# Patient Record
Sex: MALE | Race: WHITE | Employment: OTHER | URBAN - METROPOLITAN AREA
[De-identification: names, ages, dates, MRNs, and addresses within clinical notes are randomized per-mention and may not be internally consistent; named-entity substitution may affect disease eponyms.]

---

## 2018-06-05 ENCOUNTER — HOSPITAL ENCOUNTER (EMERGENCY)
Age: 73
Discharge: HOME | End: 2018-06-05
Attending: EMERGENCY MEDICINE
Payer: MEDICARE

## 2018-06-05 ENCOUNTER — APPOINTMENT (OUTPATIENT)
Dept: GENERAL RADIOLOGY | Age: 73
End: 2018-06-05
Payer: MEDICARE

## 2018-06-05 VITALS
HEART RATE: 78 BPM | WEIGHT: 213 LBS | TEMPERATURE: 98.7 F | DIASTOLIC BLOOD PRESSURE: 55 MMHG | RESPIRATION RATE: 16 BRPM | SYSTOLIC BLOOD PRESSURE: 123 MMHG | HEIGHT: 69 IN | BODY MASS INDEX: 31.55 KG/M2 | OXYGEN SATURATION: 95 %

## 2018-06-05 DIAGNOSIS — R05.9 COUGH: ICD-10-CM

## 2018-06-05 DIAGNOSIS — J40 BRONCHITIS: Primary | ICD-10-CM

## 2018-06-05 LAB
ANION GAP SERPL CALCULATED.3IONS-SCNC: 10 MMOL/L (ref 7–16)
BASOPHILS ABSOLUTE: 0.02 E9/L (ref 0–0.2)
BASOPHILS RELATIVE PERCENT: 0.6 % (ref 0–2)
BUN BLDV-MCNC: 10 MG/DL (ref 8–23)
CALCIUM SERPL-MCNC: 8.5 MG/DL (ref 8.6–10.2)
CHLORIDE BLD-SCNC: 100 MMOL/L (ref 98–107)
CO2: 24 MMOL/L (ref 22–29)
CREAT SERPL-MCNC: 0.8 MG/DL (ref 0.7–1.2)
EOSINOPHILS ABSOLUTE: 0.09 E9/L (ref 0.05–0.5)
EOSINOPHILS RELATIVE PERCENT: 2.9 % (ref 0–6)
GFR AFRICAN AMERICAN: >60
GFR NON-AFRICAN AMERICAN: >60 ML/MIN/1.73
GLUCOSE BLD-MCNC: 115 MG/DL (ref 74–109)
HCT VFR BLD CALC: 33.8 % (ref 37–54)
HEMOGLOBIN: 10.8 G/DL (ref 12.5–16.5)
IMMATURE GRANULOCYTES #: 0.02 E9/L
IMMATURE GRANULOCYTES %: 0.6 % (ref 0–5)
LYMPHOCYTES ABSOLUTE: 0.37 E9/L (ref 1.5–4)
LYMPHOCYTES RELATIVE PERCENT: 11.8 % (ref 20–42)
MCH RBC QN AUTO: 31.3 PG (ref 26–35)
MCHC RBC AUTO-ENTMCNC: 32 % (ref 32–34.5)
MCV RBC AUTO: 98 FL (ref 80–99.9)
MONOCYTES ABSOLUTE: 0.71 E9/L (ref 0.1–0.95)
MONOCYTES RELATIVE PERCENT: 22.6 % (ref 2–12)
NEUTROPHILS ABSOLUTE: 1.93 E9/L (ref 1.8–7.3)
NEUTROPHILS RELATIVE PERCENT: 61.5 % (ref 43–80)
PDW BLD-RTO: 15.6 FL (ref 11.5–15)
PLATELET # BLD: 61 E9/L (ref 130–450)
PLATELET CONFIRMATION: NORMAL
PMV BLD AUTO: 10.7 FL (ref 7–12)
POTASSIUM SERPL-SCNC: 4.5 MMOL/L (ref 3.5–5)
PRO-BNP: 356 PG/ML (ref 0–125)
RBC # BLD: 3.45 E12/L (ref 3.8–5.8)
SODIUM BLD-SCNC: 134 MMOL/L (ref 132–146)
TROPONIN: 0.02 NG/ML (ref 0–0.03)
WBC # BLD: 3.1 E9/L (ref 4.5–11.5)

## 2018-06-05 PROCEDURE — 6370000000 HC RX 637 (ALT 250 FOR IP): Performed by: STUDENT IN AN ORGANIZED HEALTH CARE EDUCATION/TRAINING PROGRAM

## 2018-06-05 PROCEDURE — 99284 EMERGENCY DEPT VISIT MOD MDM: CPT

## 2018-06-05 PROCEDURE — 94664 DEMO&/EVAL PT USE INHALER: CPT

## 2018-06-05 PROCEDURE — 80048 BASIC METABOLIC PNL TOTAL CA: CPT

## 2018-06-05 PROCEDURE — 94640 AIRWAY INHALATION TREATMENT: CPT

## 2018-06-05 PROCEDURE — 71046 X-RAY EXAM CHEST 2 VIEWS: CPT

## 2018-06-05 PROCEDURE — 83880 ASSAY OF NATRIURETIC PEPTIDE: CPT

## 2018-06-05 PROCEDURE — 85025 COMPLETE CBC W/AUTO DIFF WBC: CPT

## 2018-06-05 PROCEDURE — 84484 ASSAY OF TROPONIN QUANT: CPT

## 2018-06-05 RX ORDER — GUAIFENESIN 1200 MG/1
1 TABLET, EXTENDED RELEASE ORAL 2 TIMES DAILY PRN
Qty: 14 TABLET | Refills: 0 | Status: SHIPPED | OUTPATIENT
Start: 2018-06-05 | End: 2018-06-12

## 2018-06-05 RX ORDER — BENZONATATE 100 MG/1
100 CAPSULE ORAL 3 TIMES DAILY PRN
Qty: 21 CAPSULE | Refills: 0 | Status: SHIPPED | OUTPATIENT
Start: 2018-06-05 | End: 2018-06-12

## 2018-06-05 RX ORDER — IPRATROPIUM BROMIDE AND ALBUTEROL SULFATE 2.5; .5 MG/3ML; MG/3ML
3 SOLUTION RESPIRATORY (INHALATION) ONCE
Status: COMPLETED | OUTPATIENT
Start: 2018-06-05 | End: 2018-06-05

## 2018-06-05 RX ORDER — ALBUTEROL SULFATE 90 UG/1
2 AEROSOL, METERED RESPIRATORY (INHALATION) EVERY 6 HOURS PRN
Qty: 1 INHALER | Refills: 0 | Status: ON HOLD | OUTPATIENT
Start: 2018-06-05 | End: 2018-09-13 | Stop reason: HOSPADM

## 2018-06-05 RX ORDER — METOPROLOL SUCCINATE 50 MG/1
50 TABLET, EXTENDED RELEASE ORAL DAILY
COMMUNITY

## 2018-06-05 RX ADMIN — IPRATROPIUM BROMIDE AND ALBUTEROL SULFATE 3 AMPULE: .5; 3 SOLUTION RESPIRATORY (INHALATION) at 13:00

## 2018-06-05 ASSESSMENT — ENCOUNTER SYMPTOMS
SORE THROAT: 0
CONSTIPATION: 0
VOMITING: 0
COUGH: 1
NAUSEA: 0
DIARRHEA: 0
CHEST TIGHTNESS: 0
WHEEZING: 1
SHORTNESS OF BREATH: 1
ABDOMINAL PAIN: 0
BACK PAIN: 0
RHINORRHEA: 1

## 2018-09-07 ENCOUNTER — HOSPITAL ENCOUNTER (OUTPATIENT)
Dept: ULTRASOUND IMAGING | Age: 73
Discharge: HOME | End: 2018-09-09
Payer: MEDICARE

## 2018-09-07 DIAGNOSIS — D69.6 THROMBOCYTOPENIA, UNSPECIFIED (HCC): ICD-10-CM

## 2018-09-07 PROCEDURE — 76700 US EXAM ABDOM COMPLETE: CPT

## 2018-09-08 ENCOUNTER — APPOINTMENT (OUTPATIENT)
Dept: CT IMAGING | Age: 73
DRG: 571 | End: 2018-09-08
Payer: MEDICARE

## 2018-09-08 ENCOUNTER — APPOINTMENT (OUTPATIENT)
Dept: GENERAL RADIOLOGY | Age: 73
DRG: 571 | End: 2018-09-08
Payer: MEDICARE

## 2018-09-08 ENCOUNTER — HOSPITAL ENCOUNTER (INPATIENT)
Age: 73
LOS: 5 days | Discharge: NURSING, REHAB OR LONG-TERM CARE | DRG: 571 | End: 2018-09-13
Attending: EMERGENCY MEDICINE | Admitting: INTERNAL MEDICINE
Payer: MEDICARE

## 2018-09-08 ENCOUNTER — APPOINTMENT (OUTPATIENT)
Dept: ULTRASOUND IMAGING | Age: 73
DRG: 571 | End: 2018-09-08
Payer: MEDICARE

## 2018-09-08 DIAGNOSIS — R19.7 DIARRHEA, UNSPECIFIED TYPE: ICD-10-CM

## 2018-09-08 DIAGNOSIS — L03.115 CELLULITIS OF RIGHT LOWER EXTREMITY: Primary | ICD-10-CM

## 2018-09-08 PROBLEM — L03.90 CELLULITIS: Status: ACTIVE | Noted: 2018-09-08

## 2018-09-08 PROBLEM — I10 HYPERTENSION: Chronic | Status: ACTIVE | Noted: 2018-09-08

## 2018-09-08 LAB
ALBUMIN SERPL-MCNC: 3 G/DL (ref 3.5–5.2)
ALP BLD-CCNC: 63 U/L (ref 40–129)
ALT SERPL-CCNC: 18 U/L (ref 0–40)
ANION GAP SERPL CALCULATED.3IONS-SCNC: 12 MMOL/L (ref 7–16)
ANISOCYTOSIS: ABNORMAL
ANTISTREPTOLYSIN-O: 68 IU/ML (ref 0–200)
AST SERPL-CCNC: 39 U/L (ref 0–39)
BASOPHILS ABSOLUTE: 0.01 E9/L (ref 0–0.2)
BASOPHILS RELATIVE PERCENT: 0.1 % (ref 0–2)
BILIRUB SERPL-MCNC: 2.2 MG/DL (ref 0–1.2)
BUN BLDV-MCNC: 19 MG/DL (ref 8–23)
C-REACTIVE PROTEIN: 8.5 MG/DL (ref 0–0.4)
CALCIUM SERPL-MCNC: 8.5 MG/DL (ref 8.6–10.2)
CHLORIDE BLD-SCNC: 98 MMOL/L (ref 98–107)
CO2: 22 MMOL/L (ref 22–29)
CREAT SERPL-MCNC: 0.9 MG/DL (ref 0.7–1.2)
DOHLE BODIES: ABNORMAL
EKG ATRIAL RATE: 90 BPM
EKG P AXIS: 8 DEGREES
EKG P-R INTERVAL: 156 MS
EKG Q-T INTERVAL: 354 MS
EKG QRS DURATION: 96 MS
EKG QTC CALCULATION (BAZETT): 433 MS
EKG R AXIS: 3 DEGREES
EKG T AXIS: 22 DEGREES
EKG VENTRICULAR RATE: 90 BPM
EOSINOPHILS ABSOLUTE: 0.01 E9/L (ref 0.05–0.5)
EOSINOPHILS RELATIVE PERCENT: 0.1 % (ref 0–6)
GFR AFRICAN AMERICAN: >60
GFR NON-AFRICAN AMERICAN: >60 ML/MIN/1.73
GLUCOSE BLD-MCNC: 180 MG/DL (ref 74–109)
HBA1C MFR BLD: 5.9 % (ref 4–5.6)
HCT VFR BLD CALC: 35.1 % (ref 37–54)
HEMOGLOBIN: 11.2 G/DL (ref 12.5–16.5)
IMMATURE GRANULOCYTES #: 0.81 E9/L
IMMATURE GRANULOCYTES %: 7.1 % (ref 0–5)
LACTIC ACID: 2 MMOL/L (ref 0.5–2.2)
LACTIC ACID: 2.8 MMOL/L (ref 0.5–2.2)
LYMPHOCYTES ABSOLUTE: 0.38 E9/L (ref 1.5–4)
LYMPHOCYTES RELATIVE PERCENT: 3.3 % (ref 20–42)
MCH RBC QN AUTO: 31.6 PG (ref 26–35)
MCHC RBC AUTO-ENTMCNC: 31.9 % (ref 32–34.5)
MCV RBC AUTO: 99.2 FL (ref 80–99.9)
MONOCYTES ABSOLUTE: 1.49 E9/L (ref 0.1–0.95)
MONOCYTES RELATIVE PERCENT: 13 % (ref 2–12)
NEUTROPHILS ABSOLUTE: 8.73 E9/L (ref 1.8–7.3)
NEUTROPHILS RELATIVE PERCENT: 76.4 % (ref 43–80)
OVALOCYTES: ABNORMAL
PDW BLD-RTO: 17.2 FL (ref 11.5–15)
PLATELET # BLD: 46 E9/L (ref 130–450)
PLATELET CONFIRMATION: NORMAL
PMV BLD AUTO: 10.8 FL (ref 7–12)
POIKILOCYTES: ABNORMAL
POLYCHROMASIA: ABNORMAL
POTASSIUM SERPL-SCNC: 3.7 MMOL/L (ref 3.5–5)
PRO-BNP: 1393 PG/ML (ref 0–125)
RBC # BLD: 3.54 E12/L (ref 3.8–5.8)
SEDIMENTATION RATE, ERYTHROCYTE: 0 MM/HR (ref 0–15)
SODIUM BLD-SCNC: 132 MMOL/L (ref 132–146)
TOTAL PROTEIN: 7.2 G/DL (ref 6.4–8.3)
TROPONIN: 0.03 NG/ML (ref 0–0.03)
WBC # BLD: 11.4 E9/L (ref 4.5–11.5)

## 2018-09-08 PROCEDURE — 6370000000 HC RX 637 (ALT 250 FOR IP): Performed by: INTERNAL MEDICINE

## 2018-09-08 PROCEDURE — 83880 ASSAY OF NATRIURETIC PEPTIDE: CPT

## 2018-09-08 PROCEDURE — 93005 ELECTROCARDIOGRAM TRACING: CPT | Performed by: NURSE PRACTITIONER

## 2018-09-08 PROCEDURE — 1200000000 HC SEMI PRIVATE

## 2018-09-08 PROCEDURE — 96374 THER/PROPH/DIAG INJ IV PUSH: CPT

## 2018-09-08 PROCEDURE — 83036 HEMOGLOBIN GLYCOSYLATED A1C: CPT

## 2018-09-08 PROCEDURE — 87070 CULTURE OTHR SPECIMN AEROBIC: CPT

## 2018-09-08 PROCEDURE — 6360000002 HC RX W HCPCS: Performed by: SPECIALIST

## 2018-09-08 PROCEDURE — 6370000000 HC RX 637 (ALT 250 FOR IP): Performed by: NURSE PRACTITIONER

## 2018-09-08 PROCEDURE — 2580000003 HC RX 258: Performed by: INTERNAL MEDICINE

## 2018-09-08 PROCEDURE — 93971 EXTREMITY STUDY: CPT

## 2018-09-08 PROCEDURE — 71275 CT ANGIOGRAPHY CHEST: CPT

## 2018-09-08 PROCEDURE — 87149 DNA/RNA DIRECT PROBE: CPT

## 2018-09-08 PROCEDURE — 87186 SC STD MICRODIL/AGAR DIL: CPT

## 2018-09-08 PROCEDURE — 86060 ANTISTREPTOLYSIN O TITER: CPT

## 2018-09-08 PROCEDURE — 99285 EMERGENCY DEPT VISIT HI MDM: CPT

## 2018-09-08 PROCEDURE — 85025 COMPLETE CBC W/AUTO DIFF WBC: CPT

## 2018-09-08 PROCEDURE — 73630 X-RAY EXAM OF FOOT: CPT

## 2018-09-08 PROCEDURE — 6360000004 HC RX CONTRAST MEDICATION: Performed by: RADIOLOGY

## 2018-09-08 PROCEDURE — 87040 BLOOD CULTURE FOR BACTERIA: CPT

## 2018-09-08 PROCEDURE — 84484 ASSAY OF TROPONIN QUANT: CPT

## 2018-09-08 PROCEDURE — 71045 X-RAY EXAM CHEST 1 VIEW: CPT

## 2018-09-08 PROCEDURE — 86140 C-REACTIVE PROTEIN: CPT

## 2018-09-08 PROCEDURE — 85651 RBC SED RATE NONAUTOMATED: CPT

## 2018-09-08 PROCEDURE — 83605 ASSAY OF LACTIC ACID: CPT

## 2018-09-08 PROCEDURE — 80053 COMPREHEN METABOLIC PANEL: CPT

## 2018-09-08 PROCEDURE — 36415 COLL VENOUS BLD VENIPUNCTURE: CPT

## 2018-09-08 PROCEDURE — 2580000003 HC RX 258: Performed by: SPECIALIST

## 2018-09-08 PROCEDURE — 6360000002 HC RX W HCPCS: Performed by: NURSE PRACTITIONER

## 2018-09-08 PROCEDURE — 2580000003 HC RX 258: Performed by: NURSE PRACTITIONER

## 2018-09-08 RX ORDER — UBIDECARENONE 75 MG
100 CAPSULE ORAL DAILY
COMMUNITY

## 2018-09-08 RX ORDER — MULTIVITAMIN WITH IRON
100 TABLET ORAL DAILY
COMMUNITY

## 2018-09-08 RX ORDER — METOPROLOL SUCCINATE 50 MG/1
50 TABLET, EXTENDED RELEASE ORAL DAILY
Status: DISCONTINUED | OUTPATIENT
Start: 2018-09-08 | End: 2018-09-13 | Stop reason: HOSPADM

## 2018-09-08 RX ORDER — SODIUM CHLORIDE 0.9 % (FLUSH) 0.9 %
10 SYRINGE (ML) INJECTION PRN
Status: DISCONTINUED | OUTPATIENT
Start: 2018-09-08 | End: 2018-09-13 | Stop reason: HOSPADM

## 2018-09-08 RX ORDER — MORPHINE SULFATE 4 MG/ML
4 INJECTION, SOLUTION INTRAMUSCULAR; INTRAVENOUS ONCE
Status: COMPLETED | OUTPATIENT
Start: 2018-09-08 | End: 2018-09-08

## 2018-09-08 RX ORDER — 0.9 % SODIUM CHLORIDE 0.9 %
500 INTRAVENOUS SOLUTION INTRAVENOUS ONCE
Status: COMPLETED | OUTPATIENT
Start: 2018-09-08 | End: 2018-09-08

## 2018-09-08 RX ORDER — ALBUTEROL SULFATE 90 UG/1
2 AEROSOL, METERED RESPIRATORY (INHALATION) EVERY 6 HOURS PRN
Status: DISCONTINUED | OUTPATIENT
Start: 2018-09-08 | End: 2018-09-13 | Stop reason: HOSPADM

## 2018-09-08 RX ORDER — SODIUM CHLORIDE 0.9 % (FLUSH) 0.9 %
10 SYRINGE (ML) INJECTION EVERY 12 HOURS SCHEDULED
Status: DISCONTINUED | OUTPATIENT
Start: 2018-09-08 | End: 2018-09-13 | Stop reason: HOSPADM

## 2018-09-08 RX ORDER — TURMERIC 400 MG
400 CAPSULE ORAL
COMMUNITY

## 2018-09-08 RX ORDER — ACETAMINOPHEN 325 MG/1
650 TABLET ORAL ONCE
Status: COMPLETED | OUTPATIENT
Start: 2018-09-08 | End: 2018-09-08

## 2018-09-08 RX ORDER — ONDANSETRON 2 MG/ML
4 INJECTION INTRAMUSCULAR; INTRAVENOUS EVERY 6 HOURS PRN
Status: DISCONTINUED | OUTPATIENT
Start: 2018-09-08 | End: 2018-09-13 | Stop reason: HOSPADM

## 2018-09-08 RX ADMIN — MORPHINE SULFATE 4 MG: 4 INJECTION, SOLUTION INTRAMUSCULAR; INTRAVENOUS at 11:05

## 2018-09-08 RX ADMIN — IOPAMIDOL 80 ML: 755 INJECTION, SOLUTION INTRAVENOUS at 12:19

## 2018-09-08 RX ADMIN — ALBUTEROL SULFATE 2 PUFF: 90 AEROSOL, METERED RESPIRATORY (INHALATION) at 18:45

## 2018-09-08 RX ADMIN — Medication 10 ML: at 15:52

## 2018-09-08 RX ADMIN — ACETAMINOPHEN 650 MG: 325 TABLET ORAL at 11:03

## 2018-09-08 RX ADMIN — Medication 10 ML: at 18:45

## 2018-09-08 RX ADMIN — SODIUM CHLORIDE 500 ML: 9 INJECTION, SOLUTION INTRAVENOUS at 12:34

## 2018-09-08 RX ADMIN — CEFEPIME HYDROCHLORIDE 2 G: 2 INJECTION, POWDER, FOR SOLUTION INTRAVENOUS at 20:00

## 2018-09-08 ASSESSMENT — PAIN SCALES - GENERAL
PAINLEVEL_OUTOF10: 10
PAINLEVEL_OUTOF10: 10
PAINLEVEL_OUTOF10: 0
PAINLEVEL_OUTOF10: 10
PAINLEVEL_OUTOF10: 0

## 2018-09-08 ASSESSMENT — PAIN DESCRIPTION - LOCATION: LOCATION: LEG

## 2018-09-08 ASSESSMENT — PAIN DESCRIPTION - DESCRIPTORS: DESCRIPTORS: ACHING

## 2018-09-08 ASSESSMENT — PAIN DESCRIPTION - PAIN TYPE: TYPE: ACUTE PAIN

## 2018-09-08 ASSESSMENT — PAIN DESCRIPTION - ORIENTATION: ORIENTATION: RIGHT

## 2018-09-08 NOTE — ED NOTES
Right foot ball of foot with 0.5 cm laceration made by Dr Alpa Bowen using sterile technique and culture obtained. No drainage noted.       Vanessa Alas RN  09/08/18 [id number]

## 2018-09-08 NOTE — CONSULTS
Cans of beer per week    Drug use: No    Sexual activity: Not Asked     Other Topics Concern    None     Social History Narrative    None      Pets: None  Travel: No  Patient retired from working heavy machinery    Family History:   History reviewed. No pertinent family history. . Otherwise non-pertinent to the chief complaint. REVIEW OF SYSTEMS:    Constitutional: Positive for subjective fevers, chills, diaphoresis  Neurologic: Negative   Psychiatric: Negative  Rheumatologic: Negative   Endocrine: Negative  Hematologic: Negative  Immunologic: Negative  ENT: Negative  Respiratory: Negative   Cardiovascular: Negative  GI: Negative  : Negative  Musculoskeletal: Negative  Skin: No rashes. PHYSICAL EXAM:    Vitals:   BP (!) 110/56   Pulse 89   Temp 98.6 °F (37 °C) (Oral)   Resp 18   Ht 5' 9\" (1.753 m)   Wt 213 lb (96.6 kg)   SpO2 95%   BMI 31.45 kg/m²   Constitutional: The patient is awake, alert, and oriented. Skin: Warm and dry. No rashes were noted. HEENT: Eyes show round, and reactive pupils. No jaundice. Moist mucous membranes, no ulcerations, no thrush. Neck: Supple to movements. No lymphadenopathy. Chest: No use of accessory muscles to breathe. Symmetrical expansion. Auscultation reveals no wheezing, crackles, or rhonchi. Cardiovascular: S1 and S2 are rhythmic and regular. No murmurs appreciated. Abdomen: Positive bowel sounds to auscultation. Benign to palpation. No masses felt. No hepatosplenomegaly. Extremities: The right leg is diffusely erythematous. The right foot is quite edematous and twice the size of the left foot. There is a couple of blisters on the bottom of the foot and one starting on the dorsum of the 2nd toe. No crepitus.   Lines: peripheral      CBC+dif:  Recent Labs      09/08/18   1045   WBC  11.4   HGB  11.2*   HCT  35.1*   MCV  99.2   PLT  46*   NEUTROABS  8.73*     No results found for: CRP   No results found for: CRP  Lab Results   Component Value Date

## 2018-09-08 NOTE — ED PROVIDER NOTES
ED Attending  CC: No    HPI:  9/8/18, Time: 10:28 AM         Harvinder Solano is a 68 y.o. male presenting to the ED for right lower leg swelling, redness and pain going on over the last few weeks worsening over the last week or 2 per patient. Patient denies any history of DVT or blood clots. Patient is not on any anticoagulation medication. Patient denies any direct injury or trauma to the right lower extremity. Patient states he does have a history of neuropathy. Patient noticed he had some blistering to the right plantar aspect of his foot several weeks ago. He states that the redness and swelling has crept up from his foot onto his calf. Patient states that he has had tactile fevers at home. He has also had sweats. Patient states that he has an increase in shortness of breath over the last week or 2 as well. Patient states he is not normally short of breath like this. Patient denies any chest pain. Patient denies any abdominal pain, nausea, vomiting, or palpitations. Patient states that he is still ambulatory however it is getting more difficult to walk secondary to the swelling and pain. ROS:   Pertinent positives and negatives are stated within HPI, all other systems reviewed and are negative.  --------------------------------------------- PAST HISTORY ---------------------------------------------  Past Medical History:  has a past medical history of Hypertension and Neuropathy. Past Surgical History:  has a past surgical history that includes colectomy (2005); colostomy (2005); and Revision Colostomy (2005). Social History:  reports that he has quit smoking. He has never used smokeless tobacco. He reports that he drinks about 21.6 oz of alcohol per week . He reports that he does not use drugs. Family History: family history is not on file. The patients home medications have been reviewed.     Allergies: Patient has no known Range    WBC 11.4 4.5 - 11.5 E9/L    RBC 3.54 (L) 3.80 - 5.80 E12/L    Hemoglobin 11.2 (L) 12.5 - 16.5 g/dL    Hematocrit 35.1 (L) 37.0 - 54.0 %    MCV 99.2 80.0 - 99.9 fL    MCH 31.6 26.0 - 35.0 pg    MCHC 31.9 (L) 32.0 - 34.5 %    RDW 17.2 (H) 11.5 - 15.0 fL    Platelets 46 (L) 955 - 450 E9/L    MPV 10.8 7.0 - 12.0 fL    Neutrophils % 76.4 43.0 - 80.0 %    Immature Granulocytes % 7.1 (H) 0.0 - 5.0 %    Lymphocytes % 3.3 (L) 20.0 - 42.0 %    Monocytes % 13.0 (H) 2.0 - 12.0 %    Eosinophils % 0.1 0.0 - 6.0 %    Basophils % 0.1 0.0 - 2.0 %    Neutrophils # 8.73 (H) 1.80 - 7.30 E9/L    Immature Granulocytes # 0.81 E9/L    Lymphocytes # 0.38 (L) 1.50 - 4.00 E9/L    Monocytes # 1.49 (H) 0.10 - 0.95 E9/L    Eosinophils # 0.01 (L) 0.05 - 0.50 E9/L    Basophils # 0.01 0.00 - 0.20 E9/L    Dohle Bodies 1+     Anisocytosis 1+     Polychromasia 1+     Poikilocytes 1+     Ovalocytes 1+    Comprehensive Metabolic Panel   Result Value Ref Range    Sodium 132 132 - 146 mmol/L    Potassium 3.7 3.5 - 5.0 mmol/L    Chloride 98 98 - 107 mmol/L    CO2 22 22 - 29 mmol/L    Anion Gap 12 7 - 16 mmol/L    Glucose 180 (H) 74 - 109 mg/dL    BUN 19 8 - 23 mg/dL    CREATININE 0.9 0.7 - 1.2 mg/dL    GFR Non-African American >60 >=60 mL/min/1.73    GFR African American >60     Calcium 8.5 (L) 8.6 - 10.2 mg/dL    Total Protein 7.2 6.4 - 8.3 g/dL    Alb 3.0 (L) 3.5 - 5.2 g/dL    Total Bilirubin 2.2 (H) 0.0 - 1.2 mg/dL    Alkaline Phosphatase 63 40 - 129 U/L    ALT 18 0 - 40 U/L    AST 39 0 - 39 U/L   Lactic acid, plasma   Result Value Ref Range    Lactic Acid 2.8 (H) 0.5 - 2.2 mmol/L   Troponin   Result Value Ref Range    Troponin 0.03 0.00 - 0.03 ng/mL   Brain Natriuretic Peptide   Result Value Ref Range    Pro-BNP 1,393 (H) 0 - 125 pg/mL   Platelet Confirmation   Result Value Ref Range    Platelet Confirmation CONFIRMED    Lactic acid, plasma   Result Value Ref Range    Lactic Acid 2.0 0.5 - 2.2 mmol/L   Sedimentation Rate   Result Value Ref

## 2018-09-08 NOTE — PROGRESS NOTES
file.     Social History Main Topics    Smoking status: Former Smoker    Smokeless tobacco: Never Used      Comment: quit in 1990     Alcohol use 21.6 oz/week     36 Cans of beer per week    Drug use: No    Sexual activity: Not on file     Other Topics Concern    Not on file     Social History Narrative    No narrative on file     Family History:   History reviewed. No pertinent family history. PHYSICAL EXAM:      Vitals:    BP (!) 110/56   Pulse 76   Temp 98.1 °F (36.7 °C) (Oral)   Resp 16   Ht 5' 9\" (1.753 m)   Wt 213 lb (96.6 kg)   SpO2 97%   BMI 31.45 kg/m²     DERM: Right foot and lower extremity is erythematous, edematous. Increase in temperature noted. Dorsal right foot 1st interspace small fluctuant area. Dark in appearance. There is no crepitus however. Plantar right foot blistered areas appreciated as well some discoloration noted proximal aspect. Again no crepitus. NEUROLOGIC: Protective sensation diminished. VASCULAR: Dorsalis pedis and posterior tibial pulses audible utilizing hand-held Doppler bilateral.  MUSCULOSKELETAL: Muscle strength 5/5. Wound Care Documentation:  Wound 09/08/18 Other (Comment) Foot Right (Active)   Dressing Status Clean;Dry; Intact 9/8/2018  3:00 PM   Dressing Changed Changed/New 9/8/2018  3:00 PM   Dressing/Treatment Dry dressing 9/8/2018  3:00 PM   Wound Cleansed Rinsed/Irrigated with saline 9/8/2018  3:00 PM   Wound Length (cm) 0.5 cm 9/8/2018  3:00 PM   Wound Width (cm) 0.1 cm 9/8/2018  3:00 PM   Calculated Wound Size (cm^2) (l*w) 0.05 cm^2 9/8/2018  3:00 PM   Wound Assessment Edema; Red 9/8/2018  3:00 PM   Sabiha-wound Assessment Edema;Dry 9/8/2018  3:00 PM   Number of days: 0       Labs:  Cultures : No results found for: ORG  No results found for: WNDABS    X-rays of the right foot did not demonstrate any soft tissue air. There is no apparent bony destruction. Vascular calcifications were evident. IMPRESSION/RECOMMENDATIONS:    1. NPO after midnight.   OR in am for incision and drainage. Medicine for clearance. Discussed in detail possible complications including but not limited to persistence, delayed healing, loss of limb/life. No guarantees were made regarding a successful outcome, patient is fully aware end result may be a proximal type amputation. Still wished to proceed.       Electronically signed by Lily Rocha DPM on 9/8/2018 at 6:03 PM

## 2018-09-09 ENCOUNTER — ANESTHESIA (OUTPATIENT)
Dept: OPERATING ROOM | Age: 73
DRG: 571 | End: 2018-09-09
Payer: MEDICARE

## 2018-09-09 ENCOUNTER — ANESTHESIA EVENT (OUTPATIENT)
Dept: OPERATING ROOM | Age: 73
DRG: 571 | End: 2018-09-09
Payer: MEDICARE

## 2018-09-09 VITALS — OXYGEN SATURATION: 100 % | DIASTOLIC BLOOD PRESSURE: 55 MMHG | SYSTOLIC BLOOD PRESSURE: 107 MMHG

## 2018-09-09 PROBLEM — R78.81 BACTEREMIA: Status: ACTIVE | Noted: 2018-09-09

## 2018-09-09 LAB
ANION GAP SERPL CALCULATED.3IONS-SCNC: 13 MMOL/L (ref 7–16)
BUN BLDV-MCNC: 23 MG/DL (ref 8–23)
CALCIUM SERPL-MCNC: 8.2 MG/DL (ref 8.6–10.2)
CHLORIDE BLD-SCNC: 102 MMOL/L (ref 98–107)
CO2: 20 MMOL/L (ref 22–29)
CREAT SERPL-MCNC: 0.8 MG/DL (ref 0.7–1.2)
GFR AFRICAN AMERICAN: >60
GFR NON-AFRICAN AMERICAN: >60 ML/MIN/1.73
GLUCOSE BLD-MCNC: 136 MG/DL (ref 74–109)
HCT VFR BLD CALC: 33.5 % (ref 37–54)
HEMOGLOBIN: 11 G/DL (ref 12.5–16.5)
INR BLD: 1.9
MCH RBC QN AUTO: 31.8 PG (ref 26–35)
MCHC RBC AUTO-ENTMCNC: 32.8 % (ref 32–34.5)
MCV RBC AUTO: 96.8 FL (ref 80–99.9)
PDW BLD-RTO: 17.2 FL (ref 11.5–15)
PLATELET # BLD: 52 E9/L (ref 130–450)
PLATELET CONFIRMATION: NORMAL
PMV BLD AUTO: 10.5 FL (ref 7–12)
POTASSIUM SERPL-SCNC: 3.6 MMOL/L (ref 3.5–5)
PROTHROMBIN TIME: 20.7 SEC (ref 9.3–12.4)
RBC # BLD: 3.46 E12/L (ref 3.8–5.8)
SODIUM BLD-SCNC: 135 MMOL/L (ref 132–146)
WBC # BLD: 10.7 E9/L (ref 4.5–11.5)

## 2018-09-09 PROCEDURE — 7100000011 HC PHASE II RECOVERY - ADDTL 15 MIN: Performed by: PODIATRIST

## 2018-09-09 PROCEDURE — 6360000002 HC RX W HCPCS: Performed by: NURSE PRACTITIONER

## 2018-09-09 PROCEDURE — 2709999900 HC NON-CHARGEABLE SUPPLY: Performed by: PODIATRIST

## 2018-09-09 PROCEDURE — 3700000000 HC ANESTHESIA ATTENDED CARE: Performed by: PODIATRIST

## 2018-09-09 PROCEDURE — 87040 BLOOD CULTURE FOR BACTERIA: CPT

## 2018-09-09 PROCEDURE — 2580000003 HC RX 258: Performed by: INTERNAL MEDICINE

## 2018-09-09 PROCEDURE — 6360000002 HC RX W HCPCS: Performed by: ANESTHESIOLOGY

## 2018-09-09 PROCEDURE — 87102 FUNGUS ISOLATION CULTURE: CPT

## 2018-09-09 PROCEDURE — 2500000003 HC RX 250 WO HCPCS: Performed by: NURSE ANESTHETIST, CERTIFIED REGISTERED

## 2018-09-09 PROCEDURE — 1200000000 HC SEMI PRIVATE

## 2018-09-09 PROCEDURE — 3700000001 HC ADD 15 MINUTES (ANESTHESIA): Performed by: PODIATRIST

## 2018-09-09 PROCEDURE — 3600000002 HC SURGERY LEVEL 2 BASE: Performed by: PODIATRIST

## 2018-09-09 PROCEDURE — 6370000000 HC RX 637 (ALT 250 FOR IP): Performed by: INTERNAL MEDICINE

## 2018-09-09 PROCEDURE — 87075 CULTR BACTERIA EXCEPT BLOOD: CPT

## 2018-09-09 PROCEDURE — 87186 SC STD MICRODIL/AGAR DIL: CPT

## 2018-09-09 PROCEDURE — 6370000000 HC RX 637 (ALT 250 FOR IP): Performed by: PODIATRIST

## 2018-09-09 PROCEDURE — 3600000012 HC SURGERY LEVEL 2 ADDTL 15MIN: Performed by: PODIATRIST

## 2018-09-09 PROCEDURE — 87206 SMEAR FLUORESCENT/ACID STAI: CPT

## 2018-09-09 PROCEDURE — 85027 COMPLETE CBC AUTOMATED: CPT

## 2018-09-09 PROCEDURE — 6360000002 HC RX W HCPCS: Performed by: SPECIALIST

## 2018-09-09 PROCEDURE — 87176 TISSUE HOMOGENIZATION CULTR: CPT

## 2018-09-09 PROCEDURE — 0JBQ0ZZ EXCISION OF RIGHT FOOT SUBCUTANEOUS TISSUE AND FASCIA, OPEN APPROACH: ICD-10-PCS | Performed by: PODIATRIST

## 2018-09-09 PROCEDURE — 87070 CULTURE OTHR SPECIMN AEROBIC: CPT

## 2018-09-09 PROCEDURE — 80048 BASIC METABOLIC PNL TOTAL CA: CPT

## 2018-09-09 PROCEDURE — 2580000003 HC RX 258: Performed by: NURSE PRACTITIONER

## 2018-09-09 PROCEDURE — 2580000003 HC RX 258: Performed by: SPECIALIST

## 2018-09-09 PROCEDURE — 6360000002 HC RX W HCPCS: Performed by: NURSE ANESTHETIST, CERTIFIED REGISTERED

## 2018-09-09 PROCEDURE — 2500000003 HC RX 250 WO HCPCS: Performed by: PODIATRIST

## 2018-09-09 PROCEDURE — 88304 TISSUE EXAM BY PATHOLOGIST: CPT

## 2018-09-09 PROCEDURE — 36415 COLL VENOUS BLD VENIPUNCTURE: CPT

## 2018-09-09 PROCEDURE — 85610 PROTHROMBIN TIME: CPT

## 2018-09-09 PROCEDURE — 87205 SMEAR GRAM STAIN: CPT

## 2018-09-09 PROCEDURE — 87015 SPECIMEN INFECT AGNT CONCNTJ: CPT

## 2018-09-09 PROCEDURE — 7100000010 HC PHASE II RECOVERY - FIRST 15 MIN: Performed by: PODIATRIST

## 2018-09-09 PROCEDURE — 87116 MYCOBACTERIA CULTURE: CPT

## 2018-09-09 RX ORDER — BUPIVACAINE HYDROCHLORIDE 5 MG/ML
INJECTION, SOLUTION EPIDURAL; INTRACAUDAL PRN
Status: DISCONTINUED | OUTPATIENT
Start: 2018-09-09 | End: 2018-09-09 | Stop reason: HOSPADM

## 2018-09-09 RX ORDER — HYDROCODONE BITARTRATE AND ACETAMINOPHEN 5; 325 MG/1; MG/1
1 TABLET ORAL EVERY 4 HOURS PRN
Status: DISCONTINUED | OUTPATIENT
Start: 2018-09-09 | End: 2018-09-13 | Stop reason: HOSPADM

## 2018-09-09 RX ORDER — FENTANYL CITRATE 50 UG/ML
INJECTION, SOLUTION INTRAMUSCULAR; INTRAVENOUS PRN
Status: DISCONTINUED | OUTPATIENT
Start: 2018-09-09 | End: 2018-09-09 | Stop reason: SDUPTHER

## 2018-09-09 RX ORDER — FENTANYL CITRATE 50 UG/ML
25 INJECTION, SOLUTION INTRAMUSCULAR; INTRAVENOUS EVERY 5 MIN PRN
Status: DISCONTINUED | OUTPATIENT
Start: 2018-09-09 | End: 2018-09-09 | Stop reason: HOSPADM

## 2018-09-09 RX ORDER — FENTANYL CITRATE 50 UG/ML
50 INJECTION, SOLUTION INTRAMUSCULAR; INTRAVENOUS EVERY 5 MIN PRN
Status: DISCONTINUED | OUTPATIENT
Start: 2018-09-09 | End: 2018-09-09 | Stop reason: HOSPADM

## 2018-09-09 RX ORDER — MEPERIDINE HYDROCHLORIDE 25 MG/ML
12.5 INJECTION INTRAMUSCULAR; INTRAVENOUS; SUBCUTANEOUS EVERY 5 MIN PRN
Status: DISCONTINUED | OUTPATIENT
Start: 2018-09-09 | End: 2018-09-09 | Stop reason: HOSPADM

## 2018-09-09 RX ORDER — PROMETHAZINE HYDROCHLORIDE 25 MG/ML
6.25 INJECTION, SOLUTION INTRAMUSCULAR; INTRAVENOUS
Status: DISCONTINUED | OUTPATIENT
Start: 2018-09-09 | End: 2018-09-09 | Stop reason: HOSPADM

## 2018-09-09 RX ORDER — SODIUM CHLORIDE 9 MG/ML
INJECTION, SOLUTION INTRAVENOUS CONTINUOUS
Status: DISCONTINUED | OUTPATIENT
Start: 2018-09-09 | End: 2018-09-10

## 2018-09-09 RX ORDER — LIDOCAINE HYDROCHLORIDE 20 MG/ML
INJECTION, SOLUTION EPIDURAL; INFILTRATION; INTRACAUDAL; PERINEURAL PRN
Status: DISCONTINUED | OUTPATIENT
Start: 2018-09-09 | End: 2018-09-09 | Stop reason: SDUPTHER

## 2018-09-09 RX ORDER — PROPOFOL 10 MG/ML
INJECTION, EMULSION INTRAVENOUS CONTINUOUS PRN
Status: DISCONTINUED | OUTPATIENT
Start: 2018-09-09 | End: 2018-09-09 | Stop reason: SDUPTHER

## 2018-09-09 RX ORDER — DIPHENHYDRAMINE HYDROCHLORIDE 50 MG/ML
12.5 INJECTION INTRAMUSCULAR; INTRAVENOUS
Status: DISCONTINUED | OUTPATIENT
Start: 2018-09-09 | End: 2018-09-09 | Stop reason: HOSPADM

## 2018-09-09 RX ORDER — OXYCODONE HYDROCHLORIDE AND ACETAMINOPHEN 5; 325 MG/1; MG/1
1 TABLET ORAL
Status: DISCONTINUED | OUTPATIENT
Start: 2018-09-09 | End: 2018-09-09 | Stop reason: HOSPADM

## 2018-09-09 RX ADMIN — FENTANYL CITRATE 100 MCG: 50 INJECTION, SOLUTION INTRAMUSCULAR; INTRAVENOUS at 12:38

## 2018-09-09 RX ADMIN — CEFEPIME HYDROCHLORIDE 2 G: 2 INJECTION, POWDER, FOR SOLUTION INTRAVENOUS at 09:22

## 2018-09-09 RX ADMIN — ALBUTEROL SULFATE 2 PUFF: 90 AEROSOL, METERED RESPIRATORY (INHALATION) at 09:26

## 2018-09-09 RX ADMIN — AMPICILLIN SODIUM AND SULBACTAM SODIUM 3 G: 2; 1 INJECTION, POWDER, FOR SOLUTION INTRAMUSCULAR; INTRAVENOUS at 21:29

## 2018-09-09 RX ADMIN — Medication 10 ML: at 10:01

## 2018-09-09 RX ADMIN — AMPICILLIN SODIUM AND SULBACTAM SODIUM 3 G: 2; 1 INJECTION, POWDER, FOR SOLUTION INTRAMUSCULAR; INTRAVENOUS at 16:16

## 2018-09-09 RX ADMIN — HYDROCODONE BITARTRATE AND ACETAMINOPHEN 1 TABLET: 5; 325 TABLET ORAL at 16:19

## 2018-09-09 RX ADMIN — VANCOMYCIN HYDROCHLORIDE 1.5 G: 10 INJECTION, POWDER, LYOPHILIZED, FOR SOLUTION INTRAVENOUS at 12:50

## 2018-09-09 RX ADMIN — PROPOFOL 180 MCG/KG/MIN: 10 INJECTION, EMULSION INTRAVENOUS at 12:38

## 2018-09-09 RX ADMIN — LIDOCAINE HYDROCHLORIDE 40 MG: 20 INJECTION, SOLUTION EPIDURAL; INFILTRATION; INTRACAUDAL; PERINEURAL at 12:38

## 2018-09-09 RX ADMIN — AMPICILLIN SODIUM AND SULBACTAM SODIUM 3 G: 2; 1 INJECTION, POWDER, FOR SOLUTION INTRAMUSCULAR; INTRAVENOUS at 03:21

## 2018-09-09 RX ADMIN — SODIUM CHLORIDE: 9 INJECTION, SOLUTION INTRAVENOUS at 12:38

## 2018-09-09 RX ADMIN — FENTANYL CITRATE 50 MCG: 50 INJECTION INTRAMUSCULAR; INTRAVENOUS at 13:52

## 2018-09-09 RX ADMIN — Medication 10 ML: at 03:22

## 2018-09-09 RX ADMIN — AMPICILLIN SODIUM AND SULBACTAM SODIUM 3 G: 2; 1 INJECTION, POWDER, FOR SOLUTION INTRAMUSCULAR; INTRAVENOUS at 08:25

## 2018-09-09 RX ADMIN — HYDROCODONE BITARTRATE AND ACETAMINOPHEN 1 TABLET: 5; 325 TABLET ORAL at 03:21

## 2018-09-09 RX ADMIN — Medication: at 14:45

## 2018-09-09 RX ADMIN — METOPROLOL SUCCINATE 50 MG: 50 TABLET, EXTENDED RELEASE ORAL at 08:24

## 2018-09-09 ASSESSMENT — PULMONARY FUNCTION TESTS
PIF_VALUE: 1
PIF_VALUE: 0
PIF_VALUE: 0
PIF_VALUE: 1
PIF_VALUE: 0
PIF_VALUE: 1
PIF_VALUE: 0
PIF_VALUE: 1
PIF_VALUE: 0
PIF_VALUE: 0
PIF_VALUE: 1
PIF_VALUE: 1
PIF_VALUE: 0
PIF_VALUE: 1
PIF_VALUE: 0
PIF_VALUE: 1
PIF_VALUE: 0
PIF_VALUE: 1
PIF_VALUE: 1

## 2018-09-09 ASSESSMENT — PAIN DESCRIPTION - PAIN TYPE: TYPE: SURGICAL PAIN

## 2018-09-09 ASSESSMENT — PAIN SCALES - GENERAL
PAINLEVEL_OUTOF10: 9
PAINLEVEL_OUTOF10: 5
PAINLEVEL_OUTOF10: 0
PAINLEVEL_OUTOF10: 7
PAINLEVEL_OUTOF10: 4
PAINLEVEL_OUTOF10: 0
PAINLEVEL_OUTOF10: 0

## 2018-09-09 ASSESSMENT — PAIN DESCRIPTION - DESCRIPTORS
DESCRIPTORS: NUMBNESS
DESCRIPTORS: ACHING;DISCOMFORT

## 2018-09-09 ASSESSMENT — PAIN DESCRIPTION - ORIENTATION: ORIENTATION: RIGHT

## 2018-09-09 ASSESSMENT — PAIN DESCRIPTION - LOCATION: LOCATION: FOOT

## 2018-09-09 NOTE — ANESTHESIA PRE PROCEDURE
of last liquid consumption: 09/08/18                        Date of last solid food consumption: 09/08/18    BMI:   Wt Readings from Last 3 Encounters:   09/09/18 222 lb 9 oz (101 kg)   06/05/18 213 lb (96.6 kg)     Body mass index is 32.87 kg/m². CBC:   Lab Results   Component Value Date    WBC 10.7 09/09/2018    RBC 3.46 09/09/2018    HGB 11.0 09/09/2018    HCT 33.5 09/09/2018    MCV 96.8 09/09/2018    RDW 17.2 09/09/2018    PLT 52 09/09/2018       CMP:   Lab Results   Component Value Date     09/09/2018    K 3.6 09/09/2018     09/09/2018    CO2 20 09/09/2018    BUN 23 09/09/2018    CREATININE 0.8 09/09/2018    GFRAA >60 09/09/2018    LABGLOM >60 09/09/2018    GLUCOSE 136 09/09/2018    PROT 7.2 09/08/2018    CALCIUM 8.2 09/09/2018    BILITOT 2.2 09/08/2018    ALKPHOS 63 09/08/2018    AST 39 09/08/2018    ALT 18 09/08/2018       POC Tests: No results for input(s): POCGLU, POCNA, POCK, POCCL, POCBUN, POCHEMO, POCHCT in the last 72 hours. Coags:   Lab Results   Component Value Date    PROTIME 20.7 09/09/2018    INR 1.9 09/09/2018       HCG (If Applicable): No results found for: PREGTESTUR, PREGSERUM, HCG, HCGQUANT     ABGs: No results found for: PHART, PO2ART, NNH8BDD, BYW4AIA, BEART, I8SWOKPR     Type & Screen (If Applicable):  No results found for: LABABO, [address]    Anesthesia Evaluation  Patient summary reviewed no history of anesthetic complications:   Airway: Mallampati: III  TM distance: <3 FB     Mouth opening: < 3 FB Dental:          Pulmonary: breath sounds clear to auscultation                            ROS comment: Quit smoking in the early 1990s    Probable TYLER based on body habitus and h/o snoring; Never had sleep study   Cardiovascular:    (+) hypertension:,     (-) past MI, CAD and CABG/stent      Rhythm: regular  Rate: normal                    Neuro/Psych:                ROS comment: Patient states he has been told he has \"neuropathy\" of both feet; Not on meds related to this.

## 2018-09-09 NOTE — PROGRESS NOTES
Nursing Transfer Note    Data:  Summary of patients progress: S/P I&D right foot abscess  Reason for transfer: inpatient     Action:  Explained reason for transfer to Patient and Family. Report given to: 5th floor RN, using RN Handoff Navigator.   Mode of transportation: cart    Response:  RN Recommendations: see orders/notes

## 2018-09-09 NOTE — PROGRESS NOTES
Admit Date: 9/8/2018     Subjective:     A&Ox3. + diarrhea. + pain right foot/leg - feels erythema and edema have increased. No fevers overnight. No N/V. No cough/sob. No rash/itch. ROS otherwise negative x10    Scheduled Meds:   metoprolol succinate  50 mg Oral Daily    sodium chloride flush  10 mL Intravenous 2 times per day    pneumococcal 13-valent conjugate  0.5 mL Intramuscular Prior to discharge    ampicillin-sulbactam  3 g Intravenous Q6H    cefepime  2 g Intravenous Q12H     Continuous Infusions:   sodium chloride       PRN Meds:HYDROcodone 5 mg - acetaminophen, sodium chloride flush, magnesium hydroxide, ondansetron, albuterol sulfate HFA      Objective:     Patient Vitals for the past 24 hrs:   BP Temp Temp src Pulse Resp SpO2 Weight   09/09/18 0745 (!) 104/59 98.5 °F (36.9 °C) Oral 83 18 94 % -   09/09/18 0421 - - - - - - 222 lb 9 oz (101 kg)   09/08/18 1954 - - - 82 16 - -   09/08/18 1500 - 98.1 °F (36.7 °C) Oral 76 16 97 % -   09/08/18 1405 (!) 110/56 98.6 °F (37 °C) Oral 89 18 95 % -   09/08/18 1236 (!) 105/41 98.5 °F (36.9 °C) Oral 84 18 94 % -         BP (!) 104/59   Pulse 83   Temp 98.5 °F (36.9 °C) (Oral)   Resp 18   Ht 5' 9\" (1.753 m)   Wt 222 lb 9 oz (101 kg)   SpO2 94%   BMI 32.87 kg/m²     General Appearance:    Alert and oriented x3. In no acute distress   Head:    Normocephalic   Eyes:    PERRL, conjunctiva/corneas clear       Throat:   No thrush, mucous membranes moist   Neck:   Supple, no lymphadenopathy   Lungs:     Clear to auscultation bilaterally, respirations even and unlabored   Heart:    Regular rate and rhythm, no murmur   Abdomen:     Soft, non-tender, + bowel sounds, no masses   Extremities:   RLE +2-3 edema with erythema to mid leg, foot wrapped.     Skin:   Skin color, texture, turgor normal, no rashes        Data Review:    CBC With Differential:  Lab Results   Component Value Date    WBC 10.7 09/09/2018    RBC 3.46 09/09/2018    HGB 11.0 09/09/2018    HCT 33.5

## 2018-09-09 NOTE — OP NOTE
Operative Note    Patient Name  Vivienne Parekh   Sex  male     1945  [id number]       Author Type: Physician     DATE OF PROCEDURE:  2018      SURGEON:  Shyrl Lesch, D.P.M. ASSISTANT:None      PREOPERATIVE DIAGNOSIS:   Abscess right foot      POSTOPERATIVE DIAGNOSIS:   Same      OPERATION:   Incision and drainage    ANESTHESIA:  LMAC        ESTIMATED BLOOD LOSS:  Minimal, less than 50 mL. COMPLICATIONS: None         HISTORY: 68year-old brought to the OR for the above-noted procedure. Risks were discussed with patient in detail which included but not limited to persistence, multiple procedures, chronic pain, delayed healing, loss of limb/life. Patient fully aware that this is a limb salvage type procedure and ultimately amputation may be end result. No guarantees made, patient fully understood all still wished to proceed. Physical exam demonstrated intact dorsalis pedis and posterior tibial pulses. His protective sensation is diminished. Right foot dorsal aspect fluctuant area discoloration 1st interspace, no crepitus however. Erythema is noted. Plantar right foot blister fluctuant area appreciated as well however no crepitus. PROCEDURE IN DETAIL:  Following satisfactory preop evaluation patient was brought to the OR and transferred on OR table supine position. Anesthesia per anesthesia department. 0.5% Marcaine plain utilized for local anesthesia regional block type fashion. Right foot prepped and draped in usual sterile manner. Attention directed to right foot plantar aspect where again bolus type fluctuant area appreciated. At this present time utilizing a 15 blade incision was made and carried through subcutaneous tissue. Tracking was appreciated dorsal distal into the 1st interspace thus expressing 3 mL of purulent drainage. The plantar musculature and tendinous structures were appreciated and at present were clean.  Attention was directed to the dorsal aspect 1st interspace where again utilizing a 15 blade an incision was made through the subcutaneous tissue and the 1st interspace thus expressing an additional 5 mL of purulent drainage. The surrounding tissue including the skin and subcutaneous tissue was nonviable. At this present time utilizing a 15 blade sharp excisional debridement was performed to remove and including skin as well as subcutaneous tissue until good bleeding edges were obtained. Should be noted the dorsal and plantar aspect was without odor. Deep soft tissue specimens were obtained and will be sent to microbiology as well as pathology for appropriate workup. Both the dorsal and plantar wounds were then flushed with pulse . This was followed by a 50/50 mixture of sterile water and Dakin's. Any bleeders were bovied as deemed necessary. Both the dorsal and plantar wounds were then packed with moistened half-inch Nu Gauze with the mixture of sterile water and Dakin's. Plantar and dorsal wound edges were then cover utilizing Xeroform followed by dry sterile dressing. No complications encountered. Patient tolerated procedure and anesthesia well. He was transferred to PACU where he was further monitored. I discussed postoperative findings with patient as well as daughter. It should be noted as per daughter there was some concerns, patient was up at [facility] on a [address] over the holiday weekend. Also concerns of her sister has been battling MRSA infection and spends a lot of time with her father. Anticipate vascular studies soon, as well as back to OR near future may be warranted.         Electronically signed by Pilo Rooney DPM on 9/9/2018 at 1:53 PM

## 2018-09-10 LAB
ANION GAP SERPL CALCULATED.3IONS-SCNC: 10 MMOL/L (ref 7–16)
BUN BLDV-MCNC: 26 MG/DL (ref 8–23)
CALCIUM SERPL-MCNC: 7.9 MG/DL (ref 8.6–10.2)
CHLORIDE BLD-SCNC: 101 MMOL/L (ref 98–107)
CO2: 23 MMOL/L (ref 22–29)
CREAT SERPL-MCNC: 0.9 MG/DL (ref 0.7–1.2)
GFR AFRICAN AMERICAN: >60
GFR NON-AFRICAN AMERICAN: >60 ML/MIN/1.73
GLUCOSE BLD-MCNC: 153 MG/DL (ref 74–109)
GRAM STAIN ORDERABLE: NORMAL
HCT VFR BLD CALC: 33.6 % (ref 37–54)
HEMOGLOBIN: 10.7 G/DL (ref 12.5–16.5)
MCH RBC QN AUTO: 31.8 PG (ref 26–35)
MCHC RBC AUTO-ENTMCNC: 31.8 % (ref 32–34.5)
MCV RBC AUTO: 100 FL (ref 80–99.9)
METER GLUCOSE: 111 MG/DL (ref 70–110)
METER GLUCOSE: 122 MG/DL (ref 70–110)
METER GLUCOSE: 252 MG/DL (ref 70–110)
ORGANISM: ABNORMAL
PDW BLD-RTO: 17.2 FL (ref 11.5–15)
PLATELET # BLD: 63 E9/L (ref 130–450)
PLATELET CONFIRMATION: NORMAL
PMV BLD AUTO: 11 FL (ref 7–12)
POTASSIUM SERPL-SCNC: 3.1 MMOL/L (ref 3.5–5)
RBC # BLD: 3.36 E12/L (ref 3.8–5.8)
SODIUM BLD-SCNC: 134 MMOL/L (ref 132–146)
WBC # BLD: 10.4 E9/L (ref 4.5–11.5)
WOUND/ABSCESS: ABNORMAL
WOUND/ABSCESS: ABNORMAL

## 2018-09-10 PROCEDURE — 2580000003 HC RX 258: Performed by: SPECIALIST

## 2018-09-10 PROCEDURE — 6370000000 HC RX 637 (ALT 250 FOR IP): Performed by: INTERNAL MEDICINE

## 2018-09-10 PROCEDURE — 2580000003 HC RX 258: Performed by: NURSE PRACTITIONER

## 2018-09-10 PROCEDURE — 36415 COLL VENOUS BLD VENIPUNCTURE: CPT

## 2018-09-10 PROCEDURE — 2580000003 HC RX 258: Performed by: INTERNAL MEDICINE

## 2018-09-10 PROCEDURE — C1751 CATH, INF, PER/CENT/MIDLINE: HCPCS

## 2018-09-10 PROCEDURE — 82962 GLUCOSE BLOOD TEST: CPT

## 2018-09-10 PROCEDURE — 76937 US GUIDE VASCULAR ACCESS: CPT

## 2018-09-10 PROCEDURE — 6360000002 HC RX W HCPCS: Performed by: NURSE PRACTITIONER

## 2018-09-10 PROCEDURE — 1200000000 HC SEMI PRIVATE

## 2018-09-10 PROCEDURE — 2500000003 HC RX 250 WO HCPCS: Performed by: SPECIALIST

## 2018-09-10 PROCEDURE — 85027 COMPLETE CBC AUTOMATED: CPT

## 2018-09-10 PROCEDURE — 6360000002 HC RX W HCPCS: Performed by: SPECIALIST

## 2018-09-10 PROCEDURE — 80048 BASIC METABOLIC PNL TOTAL CA: CPT

## 2018-09-10 PROCEDURE — 36569 INSJ PICC 5 YR+ W/O IMAGING: CPT

## 2018-09-10 RX ORDER — HEPARIN SODIUM (PORCINE) LOCK FLUSH IV SOLN 100 UNIT/ML 100 UNIT/ML
3 SOLUTION INTRAVENOUS PRN
Status: DISCONTINUED | OUTPATIENT
Start: 2018-09-10 | End: 2018-09-13 | Stop reason: HOSPADM

## 2018-09-10 RX ORDER — ACETAMINOPHEN 325 MG/1
650 TABLET ORAL EVERY 4 HOURS PRN
Status: DISCONTINUED | OUTPATIENT
Start: 2018-09-10 | End: 2018-09-13 | Stop reason: HOSPADM

## 2018-09-10 RX ORDER — SODIUM CHLORIDE 0.9 % (FLUSH) 0.9 %
10 SYRINGE (ML) INJECTION PRN
Status: DISCONTINUED | OUTPATIENT
Start: 2018-09-10 | End: 2018-09-13 | Stop reason: HOSPADM

## 2018-09-10 RX ORDER — POTASSIUM CHLORIDE 20 MEQ/1
40 TABLET, EXTENDED RELEASE ORAL 2 TIMES DAILY WITH MEALS
Status: COMPLETED | OUTPATIENT
Start: 2018-09-10 | End: 2018-09-10

## 2018-09-10 RX ORDER — DEXTROSE MONOHYDRATE 50 MG/ML
100 INJECTION, SOLUTION INTRAVENOUS PRN
Status: DISCONTINUED | OUTPATIENT
Start: 2018-09-10 | End: 2018-09-13 | Stop reason: HOSPADM

## 2018-09-10 RX ORDER — LIDOCAINE HYDROCHLORIDE 10 MG/ML
5 INJECTION, SOLUTION EPIDURAL; INFILTRATION; INTRACAUDAL; PERINEURAL ONCE
Status: COMPLETED | OUTPATIENT
Start: 2018-09-10 | End: 2018-09-10

## 2018-09-10 RX ORDER — HEPARIN SODIUM (PORCINE) LOCK FLUSH IV SOLN 100 UNIT/ML 100 UNIT/ML
3 SOLUTION INTRAVENOUS EVERY 12 HOURS SCHEDULED
Status: DISCONTINUED | OUTPATIENT
Start: 2018-09-10 | End: 2018-09-13 | Stop reason: HOSPADM

## 2018-09-10 RX ORDER — BENZONATATE 100 MG/1
100 CAPSULE ORAL 3 TIMES DAILY
Status: DISCONTINUED | OUTPATIENT
Start: 2018-09-10 | End: 2018-09-13

## 2018-09-10 RX ORDER — DEXTROSE MONOHYDRATE 25 G/50ML
12.5 INJECTION, SOLUTION INTRAVENOUS PRN
Status: DISCONTINUED | OUTPATIENT
Start: 2018-09-10 | End: 2018-09-13 | Stop reason: HOSPADM

## 2018-09-10 RX ORDER — NICOTINE POLACRILEX 4 MG
15 LOZENGE BUCCAL PRN
Status: DISCONTINUED | OUTPATIENT
Start: 2018-09-10 | End: 2018-09-13 | Stop reason: HOSPADM

## 2018-09-10 RX ADMIN — BENZONATATE 100 MG: 100 CAPSULE ORAL at 10:45

## 2018-09-10 RX ADMIN — Medication 10 ML: at 16:30

## 2018-09-10 RX ADMIN — BENZONATATE 100 MG: 100 CAPSULE ORAL at 16:30

## 2018-09-10 RX ADMIN — LIDOCAINE HYDROCHLORIDE 2 ML: 10 INJECTION, SOLUTION EPIDURAL; INFILTRATION; INTRACAUDAL; PERINEURAL at 13:13

## 2018-09-10 RX ADMIN — ALBUTEROL SULFATE 2 PUFF: 90 AEROSOL, METERED RESPIRATORY (INHALATION) at 05:32

## 2018-09-10 RX ADMIN — POTASSIUM CHLORIDE 40 MEQ: 20 TABLET, EXTENDED RELEASE ORAL at 16:30

## 2018-09-10 RX ADMIN — Medication 300 UNITS: at 18:00

## 2018-09-10 RX ADMIN — AMPICILLIN SODIUM AND SULBACTAM SODIUM 3 G: 2; 1 INJECTION, POWDER, FOR SOLUTION INTRAMUSCULAR; INTRAVENOUS at 16:30

## 2018-09-10 RX ADMIN — VANCOMYCIN HYDROCHLORIDE 1.5 G: 10 INJECTION, POWDER, LYOPHILIZED, FOR SOLUTION INTRAVENOUS at 00:24

## 2018-09-10 RX ADMIN — AMPICILLIN SODIUM AND SULBACTAM SODIUM 3 G: 2; 1 INJECTION, POWDER, FOR SOLUTION INTRAMUSCULAR; INTRAVENOUS at 22:58

## 2018-09-10 RX ADMIN — METOPROLOL SUCCINATE 50 MG: 50 TABLET, EXTENDED RELEASE ORAL at 09:14

## 2018-09-10 RX ADMIN — SODIUM CHLORIDE: 9 INJECTION, SOLUTION INTRAVENOUS at 04:58

## 2018-09-10 RX ADMIN — BENZONATATE 100 MG: 100 CAPSULE ORAL at 22:58

## 2018-09-10 RX ADMIN — POTASSIUM CHLORIDE 40 MEQ: 20 TABLET, EXTENDED RELEASE ORAL at 10:45

## 2018-09-10 RX ADMIN — AMPICILLIN SODIUM AND SULBACTAM SODIUM 3 G: 2; 1 INJECTION, POWDER, FOR SOLUTION INTRAMUSCULAR; INTRAVENOUS at 04:58

## 2018-09-10 RX ADMIN — Medication 10 ML: at 10:45

## 2018-09-10 RX ADMIN — Medication 10 ML: at 22:57

## 2018-09-10 RX ADMIN — Medication 10 ML: at 17:59

## 2018-09-10 RX ADMIN — AMPICILLIN SODIUM AND SULBACTAM SODIUM 3 G: 2; 1 INJECTION, POWDER, FOR SOLUTION INTRAMUSCULAR; INTRAVENOUS at 10:45

## 2018-09-10 RX ADMIN — INSULIN LISPRO 3 UNITS: 100 INJECTION, SOLUTION INTRAVENOUS; SUBCUTANEOUS at 18:00

## 2018-09-10 RX ADMIN — Medication 300 UNITS: at 22:58

## 2018-09-10 ASSESSMENT — PAIN SCALES - GENERAL
PAINLEVEL_OUTOF10: 0
PAINLEVEL_OUTOF10: 0

## 2018-09-10 NOTE — PROCEDURES
PICC  Catheter insertion date 9/10/2018     Product Number:  REF [facility] [id number] VPS   Lot No: [id number]   Gauge: 4 fr   Lumen: single   R Basilic    Vein Diameter : 0.70 cm   Upper arm circumference (10CM ABOVE AC): 32 cm   Catheter Length : 47cm   Internal Length: 44 cm   Exposed Catheter Length: 3 cm   Ultrasound Used:yes  VPS Blue [place] confirms PICC tip is placed in the lower 1/3 of the SVC or at the Cavoatrial junction. Floor nurse notified PICC is okay to use.    : Sonny Owusu RN

## 2018-09-10 NOTE — PROGRESS NOTES
SF)  · Estimated Daily Protein (g):     Nutrition Risk Level: Low    Nutrition Interventions:   Continue current diet, Start ONS  Continued Inpatient Monitoring, Education Completed, Coordination of Care (CC diet ed completed; reviewed ONS options/preferences)    Nutrition Evaluation:   · Evaluation: Goals set   · Goals: Pt to consume >75% meals/ONS    · Monitoring: Meal Intake, Supplement Intake, Diet Tolerance, Skin Integrity, Wound Healing, Fluid Balance, Ascites/Edema, Weight, Comparative Standards, Pertinent Labs    See Adult Nutrition Doc Flowsheet for more detail.      Electronically signed by Rosas Campos MS, RD, LD on 9/10/18 at 12:07 PM    Contact Number: [phone]

## 2018-09-10 NOTE — PROGRESS NOTES
Problems:    Cellulitis of right lower extremity    Hypertension    Bacteremia  Resolved Problems:    * No resolved hospital problems.  *    Thrombocytopenia secondary to hypersplennism      Plan:   No active bleeding counts stable low   Will follow

## 2018-09-10 NOTE — PLAN OF CARE
Problem: Falls - Risk of:  Goal: Will remain free from falls  Will remain free from falls   Outcome: Met This Shift      Problem: Pain:  Goal: Pain level will decrease  Pain level will decrease   Outcome: Met This Shift      Problem: Infection - Surgical Site:  Goal: Will show no infection signs and symptoms  Will show no infection signs and symptoms   Outcome: Ongoing

## 2018-09-10 NOTE — PROGRESS NOTES
Subjective: The patient is awake and alert. Feels ok. Right foot pain reasonably controlled. Had left calf burning last night, but it feels ok this AM.  No chest pain/angina, but feels dyspneic with exertion and has a dry cough. No nausea or vomiting. Bowel movements loose. Objective:    BP (!) 136/58   Pulse 93   Temp 98.1 °F (36.7 °C) (Axillary)   Resp 18   Ht 5' 9\" (1.753 m)   Wt 229 lb 3 oz (104 kg)   SpO2 100%   BMI 33.85 kg/m²     Current medications that patient is taking have been reviewed. Heart:  RRR, no murmurs, gallops, or rubs.   Lungs:  Diminished but clear bilaterally, no wheeze, rales or rhonchi  Abd: bowel sounds present, nontender, nondistended, no masses  Extrem:  No clubbing or cyanosis, 3+ edema of right lower leg/foot, foot covered with surgical dressing not removed at request of podiatry, no edema or calf tenderness of left leg noted    CBC:   Lab Results   Component Value Date    WBC 10.4 09/10/2018    RBC 3.36 09/10/2018    HGB 10.7 09/10/2018    HCT 33.6 09/10/2018    .0 09/10/2018    MCH 31.8 09/10/2018    MCHC 31.8 09/10/2018    RDW 17.2 09/10/2018    PLT 63 09/10/2018    MPV 11.0 09/10/2018     BMP:    Lab Results   Component Value Date     09/10/2018    K 3.1 09/10/2018     09/10/2018    CO2 23 09/10/2018    BUN 26 09/10/2018    LABALBU 3.0 09/08/2018    CREATININE 0.9 09/10/2018    CALCIUM 7.9 09/10/2018    GFRAA >60 09/10/2018    LABGLOM >60 09/10/2018    GLUCOSE 153 09/10/2018        Blood cultures-staph      Assessment:    Patient Active Problem List   Diagnosis    Cellulitis of right lower extremity with abscess-s/p I&D    Staph bacteremia    Hypertension, benign    Hypokalemia    Hyperglycemia-?metabolic syndrome, his W0F was normal just a few days ago so I don't think he has diabetes    Thrombocytopenia with history of recent pancytopenia as an outpatient-patient reports that he has been followed by hematology and had recent outpatient

## 2018-09-10 NOTE — CARE COORDINATION
Social Work / Discharge Planning : SW met with patient and daughter and explained role as discharge Planning/ Transition of care. Patient resides alone in a one floor. Plan. Patient independent and drives. Patient denies device. Patient denies hx of SNF/HHC. Patient states plan is home at discharge. Daughter is very involved. Patient does have ID on board and Podiatry. Patient stated he aristides to OR this weekend for his foot and possibly has to go back. Patient states he is awaiting to see podiatrist today. SW discussed HHC at discharge if needed. Patient did not have a preference when choices discussed. BLADIMIR to follow Electronically signed by JUAN Goode on 9/10/2018 at 10:39 AM     Addendum : Patient has received PICC for IV antibiotics. SW followed up with patient and daughter. HHC again discussed and patient did not state preference. SW did make referral to terrell from [facility]. Patient has medicare which usually does not have good IV benefits However, he has a secondary as well. SW to await [facility] return call to see how much IV will cost. If a expense then alternative plans can be discussed.  BLADIMIR to follow Electronically signed by JUAN Goode on 9/10/2018 at 1:09 PM

## 2018-09-11 ENCOUNTER — ANESTHESIA EVENT (OUTPATIENT)
Dept: OPERATING ROOM | Age: 73
DRG: 571 | End: 2018-09-11
Payer: MEDICARE

## 2018-09-11 ENCOUNTER — APPOINTMENT (OUTPATIENT)
Dept: INTERVENTIONAL RADIOLOGY/VASCULAR | Age: 73
DRG: 571 | End: 2018-09-11
Payer: MEDICARE

## 2018-09-11 LAB
ANION GAP SERPL CALCULATED.3IONS-SCNC: 9 MMOL/L (ref 7–16)
BUN BLDV-MCNC: 25 MG/DL (ref 8–23)
CALCIUM SERPL-MCNC: 8.2 MG/DL (ref 8.6–10.2)
CHLORIDE BLD-SCNC: 107 MMOL/L (ref 98–107)
CO2: 23 MMOL/L (ref 22–29)
CREAT SERPL-MCNC: 0.9 MG/DL (ref 0.7–1.2)
GFR AFRICAN AMERICAN: >60
GFR NON-AFRICAN AMERICAN: >60 ML/MIN/1.73
GLUCOSE BLD-MCNC: 132 MG/DL (ref 74–109)
HCT VFR BLD CALC: 31.4 % (ref 37–54)
HEMOGLOBIN: 10 G/DL (ref 12.5–16.5)
MCH RBC QN AUTO: 31.6 PG (ref 26–35)
MCHC RBC AUTO-ENTMCNC: 31.8 % (ref 32–34.5)
MCV RBC AUTO: 99.4 FL (ref 80–99.9)
METER GLUCOSE: 126 MG/DL (ref 70–110)
METER GLUCOSE: 140 MG/DL (ref 70–110)
METER GLUCOSE: 178 MG/DL (ref 70–110)
PDW BLD-RTO: 16.9 FL (ref 11.5–15)
PLATELET # BLD: 65 E9/L (ref 130–450)
PLATELET CONFIRMATION: NORMAL
PMV BLD AUTO: 11.6 FL (ref 7–12)
POTASSIUM SERPL-SCNC: 3.7 MMOL/L (ref 3.5–5)
RBC # BLD: 3.16 E12/L (ref 3.8–5.8)
SODIUM BLD-SCNC: 139 MMOL/L (ref 132–146)
WBC # BLD: 7.4 E9/L (ref 4.5–11.5)

## 2018-09-11 PROCEDURE — 97161 PT EVAL LOW COMPLEX 20 MIN: CPT

## 2018-09-11 PROCEDURE — 2580000003 HC RX 258: Performed by: INTERNAL MEDICINE

## 2018-09-11 PROCEDURE — 97530 THERAPEUTIC ACTIVITIES: CPT

## 2018-09-11 PROCEDURE — 6360000002 HC RX W HCPCS: Performed by: SPECIALIST

## 2018-09-11 PROCEDURE — 2580000003 HC RX 258: Performed by: SPECIALIST

## 2018-09-11 PROCEDURE — 85027 COMPLETE CBC AUTOMATED: CPT

## 2018-09-11 PROCEDURE — G8979 MOBILITY GOAL STATUS: HCPCS

## 2018-09-11 PROCEDURE — 6370000000 HC RX 637 (ALT 250 FOR IP): Performed by: INTERNAL MEDICINE

## 2018-09-11 PROCEDURE — 36592 COLLECT BLOOD FROM PICC: CPT

## 2018-09-11 PROCEDURE — 93923 UPR/LXTR ART STDY 3+ LVLS: CPT

## 2018-09-11 PROCEDURE — 80048 BASIC METABOLIC PNL TOTAL CA: CPT

## 2018-09-11 PROCEDURE — 1200000000 HC SEMI PRIVATE

## 2018-09-11 PROCEDURE — 82962 GLUCOSE BLOOD TEST: CPT

## 2018-09-11 PROCEDURE — 36415 COLL VENOUS BLD VENIPUNCTURE: CPT

## 2018-09-11 PROCEDURE — G8978 MOBILITY CURRENT STATUS: HCPCS

## 2018-09-11 PROCEDURE — G8987 SELF CARE CURRENT STATUS: HCPCS

## 2018-09-11 PROCEDURE — G8988 SELF CARE GOAL STATUS: HCPCS

## 2018-09-11 PROCEDURE — 97165 OT EVAL LOW COMPLEX 30 MIN: CPT

## 2018-09-11 RX ORDER — LOPERAMIDE HYDROCHLORIDE 2 MG/1
2 CAPSULE ORAL 4 TIMES DAILY PRN
Status: DISCONTINUED | OUTPATIENT
Start: 2018-09-11 | End: 2018-09-13

## 2018-09-11 RX ADMIN — BENZONATATE 100 MG: 100 CAPSULE ORAL at 13:05

## 2018-09-11 RX ADMIN — AMPICILLIN SODIUM AND SULBACTAM SODIUM 3 G: 2; 1 INJECTION, POWDER, FOR SOLUTION INTRAMUSCULAR; INTRAVENOUS at 16:48

## 2018-09-11 RX ADMIN — AMPICILLIN SODIUM AND SULBACTAM SODIUM 3 G: 2; 1 INJECTION, POWDER, FOR SOLUTION INTRAMUSCULAR; INTRAVENOUS at 05:14

## 2018-09-11 RX ADMIN — AMPICILLIN SODIUM AND SULBACTAM SODIUM 3 G: 2; 1 INJECTION, POWDER, FOR SOLUTION INTRAMUSCULAR; INTRAVENOUS at 23:10

## 2018-09-11 RX ADMIN — Medication 10 ML: at 23:10

## 2018-09-11 RX ADMIN — Medication 10 ML: at 16:49

## 2018-09-11 RX ADMIN — AMPICILLIN SODIUM AND SULBACTAM SODIUM 3 G: 2; 1 INJECTION, POWDER, FOR SOLUTION INTRAMUSCULAR; INTRAVENOUS at 11:03

## 2018-09-11 RX ADMIN — Medication 300 UNITS: at 21:13

## 2018-09-11 RX ADMIN — INSULIN LISPRO 1 UNITS: 100 INJECTION, SOLUTION INTRAVENOUS; SUBCUTANEOUS at 08:14

## 2018-09-11 RX ADMIN — HYDROCODONE BITARTRATE AND ACETAMINOPHEN 1 TABLET: 5; 325 TABLET ORAL at 11:11

## 2018-09-11 RX ADMIN — INSULIN LISPRO 1 UNITS: 100 INJECTION, SOLUTION INTRAVENOUS; SUBCUTANEOUS at 17:25

## 2018-09-11 RX ADMIN — Medication 300 UNITS: at 08:13

## 2018-09-11 RX ADMIN — HYDROCODONE BITARTRATE AND ACETAMINOPHEN 1 TABLET: 5; 325 TABLET ORAL at 21:13

## 2018-09-11 RX ADMIN — ALBUTEROL SULFATE 2 PUFF: 90 AEROSOL, METERED RESPIRATORY (INHALATION) at 10:02

## 2018-09-11 RX ADMIN — Medication 10 ML: at 21:13

## 2018-09-11 RX ADMIN — Medication 10 ML: at 08:13

## 2018-09-11 RX ADMIN — BENZONATATE 100 MG: 100 CAPSULE ORAL at 08:13

## 2018-09-11 RX ADMIN — BENZONATATE 100 MG: 100 CAPSULE ORAL at 21:13

## 2018-09-11 RX ADMIN — Medication 10 ML: at 11:04

## 2018-09-11 RX ADMIN — METOPROLOL SUCCINATE 50 MG: 50 TABLET, EXTENDED RELEASE ORAL at 08:13

## 2018-09-11 ASSESSMENT — PAIN SCALES - GENERAL
PAINLEVEL_OUTOF10: 5
PAINLEVEL_OUTOF10: 0
PAINLEVEL_OUTOF10: 9
PAINLEVEL_OUTOF10: 0

## 2018-09-11 ASSESSMENT — PAIN DESCRIPTION - FREQUENCY: FREQUENCY: INTERMITTENT

## 2018-09-11 ASSESSMENT — PAIN DESCRIPTION - LOCATION: LOCATION: LEG

## 2018-09-11 ASSESSMENT — PAIN DESCRIPTION - DESCRIPTORS: DESCRIPTORS: ACHING;DISCOMFORT

## 2018-09-11 ASSESSMENT — PAIN DESCRIPTION - PAIN TYPE: TYPE: ACUTE PAIN;SURGICAL PAIN

## 2018-09-11 ASSESSMENT — PAIN DESCRIPTION - ORIENTATION: ORIENTATION: RIGHT

## 2018-09-11 NOTE — PROGRESS NOTES
pain.    Hearing: WFL  Vision: WFL    Glasses: Yes     Cognition: Patient alert and oriented grossly. Problem Solving: Fair - patient verbalized understanding of NWB status of R foot, but demonstrated Fair adherence to this NWB status during functional transfer  Safety Awareness: Fair    UE Strength/ROM:   Strength: ROM:  Comments:   R UE:  4-/5 grossly WFL    L UE: 4-/5 grossly WFL       Functional Assessment:   Initial Status:  9/11/2018  Comments:   Feeding: Independent    Grooming: SBA (seated)   Upper Body Dressing: Min A     Lower Body Dressing: Max A    Bathing: Max A    Toileting: Max A    Bed Mobility: Supine-to-Sit: SBA (with head of bed elevated)      Functional Transfers: Sit-to-Stand: Mod Ax2 from edge of bed (slightly elevated surface)  Stand-Pivot Transfer: Mod Ax2 from edge of bed to bedside chair (with walker). Cues needed to facilitate proper hand placement to maximize safety with functional transfer; Fair problem solving demonstrated. Patient demonstrated difficulty adhering to NWB status with R LE during stand-pivot transfer, despite provision of consistent cues. Functional Mobility: Not assessed.       Sitting Balance: Good  Standing Balance: Poor+ (with walker)   Endurance/Activity Tolerance: Limited  Sensation: WFL  Edema: No                         Assessment of Current Deficits:   Functional Mobility [x]  ROM [] Strength [x]  Cognition []  ADLs [x]   IADLs [x] Safety Awareness [x] Endurance [x]  Fine Motor Coordination [] Balance [x] Vision/Perception [] Sensation []   Gross Motor Coordination []    Occupations Limited By Various Client Factors and Performance Skills:  ADLs - bathing, dressing, toileting, grooming X   IADLs X   Rest and Sleep    Education    Work    Play    Leisure    Social Participation      OT Evaluation Complexity Level: Low  This level of OT evaluation was determined based upon the of the following: complexity of occupational profile and review of medical/therapy

## 2018-09-11 NOTE — ANESTHESIA PRE PROCEDURE
Department of Anesthesiology  Preprocedure Note       Name:  Vladimir Wallace   Age:  68 y.o.  :  1945                                          MRN:  [id number]         Date:  2018      Surgeon: Gage Tai):  Bryan Encarnacion DPM    Procedure: Procedure(s):  DEBRIDEMENT WOUND RIGHT FOOT     Medications prior to admission:   Prior to Admission medications    Medication Sig Start Date End Date Taking? Authorizing Provider   vitamin B-12 (CYANOCOBALAMIN) 100 MCG tablet Take 100 mcg by mouth daily    Historical Provider, MD   Turmeric 400 MG CAPS Take 400 mg by mouth    Historical Provider, MD   vitamin B-6 (PYRIDOXINE) 100 MG tablet Take 100 mg by mouth daily    Historical Provider, MD   Multiple Vitamins-Minerals (VISION-JERED PRESERVE PO) Take 1 tablet by mouth    Historical Provider, MD   Multiple Vitamin (MULTI-VITAMIN DAILY PO) Take by mouth    Historical Provider, MD   metoprolol succinate (TOPROL XL) 50 MG extended release tablet Take 50 mg by mouth daily    Historical Provider, MD   albuterol sulfate HFA (PROAIR HFA) 108 (90 Base) MCG/ACT inhaler Inhale 2 puffs into the lungs every 6 hours as needed for Wheezing 18  Pollo Triana DO       Current medications:    No current facility-administered medications for this visit. No current outpatient prescriptions on file.      Facility-Administered Medications Ordered in Other Visits   Medication Dose Route Frequency Provider Last Rate Last Dose    loperamide (IMODIUM) capsule 2 mg  2 mg Oral 4x Daily PRN Carolee Tang MD        insulin lispro (HUMALOG) injection vial 0-6 Units  0-6 Units Subcutaneous BID  Carolee Tang MD        acetaminophen (TYLENOL) tablet 650 mg  650 mg Oral Q4H PRN Carolee Tang MD        glucose (GLUTOSE) 40 % oral gel 15 g  15 g Oral PRN Carolee Tang MD        dextrose 50 % solution 12.5 g  12.5 g Intravenous PRN Carolee Tang MD        glucagon (rDNA) injection 1 mg  1 mg Intramuscular PRN Past Surgical History:        Procedure Laterality Date    COLECTOMY  2005    COLOSTOMY  2005    ND DEBRIDEMENT, SKIN, SUB-Q TISSUE,MUSCLE,BONE,=<20 SQ CM Right 9/9/2018    INCISION AND DRAINAGE ABSCESS RIGHT FOOT performed by Gorge Valenzuela DPM at [address]  2005       Social History:    Social History   Substance Use Topics    Smoking status: Former Smoker    Smokeless tobacco: Never Used      Comment: quit in 1990     Alcohol use 21.6 oz/week     36 Cans of beer per week                                Counseling given: Not Answered      Vital Signs (Current): There were no vitals filed for this visit. BP Readings from Last 3 Encounters:   09/11/18 138/80   09/09/18 (!) 107/55   06/05/18 (!) 123/55       NPO Status:  greater than 8 hours                                                                               BMI:   Wt Readings from Last 3 Encounters:   09/11/18 228 lb 4 oz (103.5 kg)   06/05/18 213 lb (96.6 kg)     There is no height or weight on file to calculate BMI.    CBC:   Lab Results   Component Value Date    WBC 7.4 09/11/2018    RBC 3.16 09/11/2018    HGB 10.0 09/11/2018    HCT 31.4 09/11/2018    MCV 99.4 09/11/2018    RDW 16.9 09/11/2018    PLT 65 09/11/2018       CMP:   Lab Results   Component Value Date     09/11/2018    K 3.7 09/11/2018     09/11/2018    CO2 23 09/11/2018    BUN 25 09/11/2018    CREATININE 0.9 09/11/2018    GFRAA >60 09/11/2018    LABGLOM >60 09/11/2018    GLUCOSE 132 09/11/2018    PROT 7.2 09/08/2018    CALCIUM 8.2 09/11/2018    BILITOT 2.2 09/08/2018    ALKPHOS 63 09/08/2018    AST 39 09/08/2018    ALT 18 09/08/2018       POC Tests: No results for input(s): POCGLU, POCNA, POCK, POCCL, POCBUN, POCHEMO, POCHCT in the last 72 hours.     Coags:   Lab Results   Component Value Date    PROTIME 20.7 09/09/2018    INR 1.9 09/09/2018       HCG (If Applicable): No results found for: PREGTESTUR, PREGSERUM, HCG, HCGQUANT     ABGs: No results found for: PHART, PO2ART, CPH4AIB, RGW4EMC, BEART, R6XOISMT     Type & Screen (If Applicable):  No results found for: LABABO, [address]    Anesthesia Evaluation  Patient summary reviewed no history of anesthetic complications:   Airway: Mallampati: III  TM distance: <3 FB     Mouth opening: < 3 FB Dental:          Pulmonary: breath sounds clear to auscultation                            ROS comment: Quit smoking in the early 1990s    Probable TYLER based on body habitus and h/o snoring; Never had sleep study   Cardiovascular:    (+) hypertension:,     (-) past MI, CAD and CABG/stent    ECG reviewed  Rhythm: regular  Rate: normal           Beta Blocker:  Order written         Neuro/Psych:                ROS comment: Patient states he has been told he has \"neuropathy\" of both feet; Not on meds related to this. GI/Hepatic/Renal:            ROS comment: H/o colon cancer with resection and multiple surgeries (more than 10 years ago per patient); No ostomy, no current issues. .   Endo/Other:    (+) Diabetes (\"borderline\" per history;  not on meds currently), .                  ROS comment: Presented with swelling and redness in the right leg and foot for about 2 weeks    Per ID service notes -> cellulitis of right foot  9/9/18 - Blood and wound cultures are positive for Staph aureus;  S/p I&D of right foot 2 days ago under IV sedation. Obese    Questionable medical compliance Abdominal:           Vascular:           ROS comment: U/S of right lower extremity done on 9/8/18 is negative . Anesthesia Plan      MAC     ASA 3     (Backup GA if needed)  Induction: intravenous. Anesthetic plan and risks discussed with patient. Plan discussed with CRNA.                 Elaine Ibarra MD   9/11/2018      Patient will need to be re-evaluated prior to surgery by DOS anesthesiologist.    Elaine Ibarra MD           9/11/2018        3:33 PM      Agree

## 2018-09-11 NOTE — PROGRESS NOTES
No complaints    Dressing intact    Wounds without purulence or odor, small amount of nonviable tissue, no odor.     Vascular studies reviewed, calcific disease    Dressing changed    Back to OR in am    See orders

## 2018-09-12 ENCOUNTER — ANESTHESIA (OUTPATIENT)
Dept: OPERATING ROOM | Age: 73
DRG: 571 | End: 2018-09-12
Payer: MEDICARE

## 2018-09-12 VITALS — SYSTOLIC BLOOD PRESSURE: 139 MMHG | DIASTOLIC BLOOD PRESSURE: 72 MMHG | OXYGEN SATURATION: 100 %

## 2018-09-12 LAB
ANAEROBIC CULTURE: NORMAL
CULTURE SURGICAL: ABNORMAL
CULTURE SURGICAL: ABNORMAL
GRAM STAIN RESULT: ABNORMAL
METER GLUCOSE: 117 MG/DL (ref 70–110)
METER GLUCOSE: 117 MG/DL (ref 70–110)
METER GLUCOSE: 149 MG/DL (ref 70–110)
ORGANISM: ABNORMAL

## 2018-09-12 PROCEDURE — 87206 SMEAR FLUORESCENT/ACID STAI: CPT

## 2018-09-12 PROCEDURE — 7100000000 HC PACU RECOVERY - FIRST 15 MIN: Performed by: PODIATRIST

## 2018-09-12 PROCEDURE — 6360000002 HC RX W HCPCS: Performed by: SPECIALIST

## 2018-09-12 PROCEDURE — 2580000003 HC RX 258: Performed by: NURSE ANESTHETIST, CERTIFIED REGISTERED

## 2018-09-12 PROCEDURE — 87070 CULTURE OTHR SPECIMN AEROBIC: CPT

## 2018-09-12 PROCEDURE — 88305 TISSUE EXAM BY PATHOLOGIST: CPT

## 2018-09-12 PROCEDURE — 82962 GLUCOSE BLOOD TEST: CPT

## 2018-09-12 PROCEDURE — 6370000000 HC RX 637 (ALT 250 FOR IP): Performed by: INTERNAL MEDICINE

## 2018-09-12 PROCEDURE — 6360000002 HC RX W HCPCS: Performed by: NURSE ANESTHETIST, CERTIFIED REGISTERED

## 2018-09-12 PROCEDURE — 02HV33Z INSERTION OF INFUSION DEVICE INTO SUPERIOR VENA CAVA, PERCUTANEOUS APPROACH: ICD-10-PCS | Performed by: SPECIALIST

## 2018-09-12 PROCEDURE — 0KBV0ZZ EXCISION OF RIGHT FOOT MUSCLE, OPEN APPROACH: ICD-10-PCS | Performed by: PODIATRIST

## 2018-09-12 PROCEDURE — 2580000003 HC RX 258: Performed by: INTERNAL MEDICINE

## 2018-09-12 PROCEDURE — 3700000000 HC ANESTHESIA ATTENDED CARE: Performed by: PODIATRIST

## 2018-09-12 PROCEDURE — 87205 SMEAR GRAM STAIN: CPT

## 2018-09-12 PROCEDURE — 87015 SPECIMEN INFECT AGNT CONCNTJ: CPT

## 2018-09-12 PROCEDURE — 6370000000 HC RX 637 (ALT 250 FOR IP): Performed by: PODIATRIST

## 2018-09-12 PROCEDURE — 2709999900 HC NON-CHARGEABLE SUPPLY: Performed by: PODIATRIST

## 2018-09-12 PROCEDURE — 1200000000 HC SEMI PRIVATE

## 2018-09-12 PROCEDURE — 88304 TISSUE EXAM BY PATHOLOGIST: CPT

## 2018-09-12 PROCEDURE — 87102 FUNGUS ISOLATION CULTURE: CPT

## 2018-09-12 PROCEDURE — 7100000001 HC PACU RECOVERY - ADDTL 15 MIN: Performed by: PODIATRIST

## 2018-09-12 PROCEDURE — 3600000012 HC SURGERY LEVEL 2 ADDTL 15MIN: Performed by: PODIATRIST

## 2018-09-12 PROCEDURE — 87116 MYCOBACTERIA CULTURE: CPT

## 2018-09-12 PROCEDURE — 87176 TISSUE HOMOGENIZATION CULTR: CPT

## 2018-09-12 PROCEDURE — 2580000003 HC RX 258: Performed by: SPECIALIST

## 2018-09-12 PROCEDURE — 3600000002 HC SURGERY LEVEL 2 BASE: Performed by: PODIATRIST

## 2018-09-12 PROCEDURE — 2500000003 HC RX 250 WO HCPCS: Performed by: NURSE ANESTHETIST, CERTIFIED REGISTERED

## 2018-09-12 PROCEDURE — 97530 THERAPEUTIC ACTIVITIES: CPT

## 2018-09-12 PROCEDURE — 3700000001 HC ADD 15 MINUTES (ANESTHESIA): Performed by: PODIATRIST

## 2018-09-12 PROCEDURE — 87075 CULTR BACTERIA EXCEPT BLOOD: CPT

## 2018-09-12 RX ORDER — MEPERIDINE HYDROCHLORIDE 25 MG/ML
12.5 INJECTION INTRAMUSCULAR; INTRAVENOUS; SUBCUTANEOUS EVERY 5 MIN PRN
Status: DISCONTINUED | OUTPATIENT
Start: 2018-09-12 | End: 2018-09-12 | Stop reason: HOSPADM

## 2018-09-12 RX ORDER — OXYCODONE HYDROCHLORIDE AND ACETAMINOPHEN 5; 325 MG/1; MG/1
1 TABLET ORAL
Status: DISCONTINUED | OUTPATIENT
Start: 2018-09-12 | End: 2018-09-12 | Stop reason: HOSPADM

## 2018-09-12 RX ORDER — FENTANYL CITRATE 50 UG/ML
25 INJECTION, SOLUTION INTRAMUSCULAR; INTRAVENOUS EVERY 5 MIN PRN
Status: DISCONTINUED | OUTPATIENT
Start: 2018-09-12 | End: 2018-09-12 | Stop reason: HOSPADM

## 2018-09-12 RX ORDER — DIPHENHYDRAMINE HYDROCHLORIDE 50 MG/ML
12.5 INJECTION INTRAMUSCULAR; INTRAVENOUS
Status: DISCONTINUED | OUTPATIENT
Start: 2018-09-12 | End: 2018-09-12 | Stop reason: HOSPADM

## 2018-09-12 RX ORDER — LISINOPRIL 10 MG/1
10 TABLET ORAL DAILY
Status: DISCONTINUED | OUTPATIENT
Start: 2018-09-12 | End: 2018-09-13

## 2018-09-12 RX ORDER — LIDOCAINE HYDROCHLORIDE 20 MG/ML
INJECTION, SOLUTION EPIDURAL; INFILTRATION; INTRACAUDAL; PERINEURAL PRN
Status: DISCONTINUED | OUTPATIENT
Start: 2018-09-12 | End: 2018-09-12 | Stop reason: SDUPTHER

## 2018-09-12 RX ORDER — PROMETHAZINE HYDROCHLORIDE 25 MG/ML
6.25 INJECTION, SOLUTION INTRAMUSCULAR; INTRAVENOUS
Status: DISCONTINUED | OUTPATIENT
Start: 2018-09-12 | End: 2018-09-12 | Stop reason: HOSPADM

## 2018-09-12 RX ORDER — SODIUM CHLORIDE 9 MG/ML
INJECTION, SOLUTION INTRAVENOUS CONTINUOUS PRN
Status: DISCONTINUED | OUTPATIENT
Start: 2018-09-12 | End: 2018-09-12 | Stop reason: SDUPTHER

## 2018-09-12 RX ORDER — PROPOFOL 10 MG/ML
INJECTION, EMULSION INTRAVENOUS CONTINUOUS PRN
Status: DISCONTINUED | OUTPATIENT
Start: 2018-09-12 | End: 2018-09-12 | Stop reason: SDUPTHER

## 2018-09-12 RX ORDER — PROPOFOL 10 MG/ML
INJECTION, EMULSION INTRAVENOUS PRN
Status: DISCONTINUED | OUTPATIENT
Start: 2018-09-12 | End: 2018-09-12 | Stop reason: SDUPTHER

## 2018-09-12 RX ORDER — FENTANYL CITRATE 50 UG/ML
INJECTION, SOLUTION INTRAMUSCULAR; INTRAVENOUS PRN
Status: DISCONTINUED | OUTPATIENT
Start: 2018-09-12 | End: 2018-09-12 | Stop reason: SDUPTHER

## 2018-09-12 RX ORDER — FENTANYL CITRATE 50 UG/ML
50 INJECTION, SOLUTION INTRAMUSCULAR; INTRAVENOUS EVERY 5 MIN PRN
Status: DISCONTINUED | OUTPATIENT
Start: 2018-09-12 | End: 2018-09-12 | Stop reason: HOSPADM

## 2018-09-12 RX ADMIN — AMPICILLIN SODIUM AND SULBACTAM SODIUM 3 G: 2; 1 INJECTION, POWDER, FOR SOLUTION INTRAMUSCULAR; INTRAVENOUS at 22:41

## 2018-09-12 RX ADMIN — INSULIN LISPRO 1 UNITS: 100 INJECTION, SOLUTION INTRAVENOUS; SUBCUTANEOUS at 16:45

## 2018-09-12 RX ADMIN — LISINOPRIL 10 MG: 10 TABLET ORAL at 11:01

## 2018-09-12 RX ADMIN — AMPICILLIN SODIUM AND SULBACTAM SODIUM 3 G: 2; 1 INJECTION, POWDER, FOR SOLUTION INTRAMUSCULAR; INTRAVENOUS at 04:58

## 2018-09-12 RX ADMIN — HYDROCODONE BITARTRATE AND ACETAMINOPHEN 1 TABLET: 5; 325 TABLET ORAL at 11:03

## 2018-09-12 RX ADMIN — PROPOFOL 50 MCG/KG/MIN: 10 INJECTION, EMULSION INTRAVENOUS at 07:35

## 2018-09-12 RX ADMIN — Medication: at 07:50

## 2018-09-12 RX ADMIN — AMPICILLIN SODIUM AND SULBACTAM SODIUM 3 G: 2; 1 INJECTION, POWDER, FOR SOLUTION INTRAMUSCULAR; INTRAVENOUS at 16:46

## 2018-09-12 RX ADMIN — AMPICILLIN SODIUM AND SULBACTAM SODIUM 3 G: 2; 1 INJECTION, POWDER, FOR SOLUTION INTRAMUSCULAR; INTRAVENOUS at 11:01

## 2018-09-12 RX ADMIN — Medication 10 ML: at 09:24

## 2018-09-12 RX ADMIN — Medication 10 ML: at 04:58

## 2018-09-12 RX ADMIN — PROPOFOL 40 MG: 10 INJECTION, EMULSION INTRAVENOUS at 07:35

## 2018-09-12 RX ADMIN — Medication 10 ML: at 20:38

## 2018-09-12 RX ADMIN — BENZONATATE 100 MG: 100 CAPSULE ORAL at 13:35

## 2018-09-12 RX ADMIN — Medication 300 UNITS: at 20:38

## 2018-09-12 RX ADMIN — SODIUM CHLORIDE: 9 INJECTION, SOLUTION INTRAVENOUS at 07:27

## 2018-09-12 RX ADMIN — METOPROLOL SUCCINATE 50 MG: 50 TABLET, EXTENDED RELEASE ORAL at 09:24

## 2018-09-12 RX ADMIN — Medication 300 UNITS: at 09:24

## 2018-09-12 RX ADMIN — FENTANYL CITRATE 50 MCG: 50 INJECTION, SOLUTION INTRAMUSCULAR; INTRAVENOUS at 07:35

## 2018-09-12 RX ADMIN — LIDOCAINE HYDROCHLORIDE 50 MG: 20 INJECTION, SOLUTION EPIDURAL; INFILTRATION; INTRACAUDAL; PERINEURAL at 07:35

## 2018-09-12 RX ADMIN — BENZONATATE 100 MG: 100 CAPSULE ORAL at 20:36

## 2018-09-12 RX ADMIN — FENTANYL CITRATE 50 MCG: 50 INJECTION, SOLUTION INTRAMUSCULAR; INTRAVENOUS at 07:43

## 2018-09-12 RX ADMIN — BENZONATATE 100 MG: 100 CAPSULE ORAL at 09:24

## 2018-09-12 ASSESSMENT — PULMONARY FUNCTION TESTS
PIF_VALUE: 1
PIF_VALUE: 0
PIF_VALUE: 0
PIF_VALUE: 1
PIF_VALUE: 0
PIF_VALUE: 1
PIF_VALUE: 0
PIF_VALUE: 1
PIF_VALUE: 0
PIF_VALUE: 1
PIF_VALUE: 0
PIF_VALUE: 1
PIF_VALUE: 0
PIF_VALUE: 1
PIF_VALUE: 1

## 2018-09-12 ASSESSMENT — PAIN DESCRIPTION - PAIN TYPE
TYPE: SURGICAL PAIN

## 2018-09-12 ASSESSMENT — PAIN SCALES - GENERAL
PAINLEVEL_OUTOF10: 2
PAINLEVEL_OUTOF10: 0
PAINLEVEL_OUTOF10: 5
PAINLEVEL_OUTOF10: 0
PAINLEVEL_OUTOF10: 2

## 2018-09-12 ASSESSMENT — PAIN DESCRIPTION - ORIENTATION
ORIENTATION: RIGHT

## 2018-09-12 ASSESSMENT — PAIN DESCRIPTION - LOCATION
LOCATION: FOOT

## 2018-09-12 NOTE — PROGRESS NOTES
3526 report called to [address] [place] [address] and and called out to family waiting room to have any family members return to room [phone]

## 2018-09-12 NOTE — PATIENT CARE CONFERENCE
P Quality Flow/Interdisciplinary Rounds Progress Note        Quality Flow Rounds held on September 12, 2018    Disciplines Attending:  Bedside Nurse, ,  and Nursing Unit Leadership    Jose Molina was admitted on 9/8/2018 10:06 AM    Anticipated Discharge Date:  Expected Discharge Date: 09/10/18    Disposition:    Yared Score:  Yared Scale Score: 19    Readmission Risk              Risk of Unplanned Readmission:        15             Discussed patient goal for the day, patient clinical progression, and barriers to discharge.   The following Goal(s) of the Day/Commitment(s) have been identified:  Discharge planning -Poss SNF next 24 hrs      Edwin Roberson  September 12, 2018
<<-----Click here for Discharge Medication Review

## 2018-09-12 NOTE — OP NOTE
Operative Note    Patient Name  Vladimir Wallace   Sex  male     1945  [id number]       Author Type: Physician     DATE OF PROCEDURE:  2018      SURGEON:  Yenifer Khan D.P.M. ASSISTANT:None      PREOPERATIVE DIAGNOSIS:   Wounds right foot ×2 dorsal aspect as well as plantar aspect surgical type status post incision and drainage of abscess      POSTOPERATIVE DIAGNOSIS:   Same      OPERATION:   Excisional debridement    ANESTHESIA:  LMAC        ESTIMATED BLOOD LOSS:  Minimal, less than 50 mL. COMPLICATIONS: None        HISTORY: Patient is brought to the OR for the above-noted procedure. Patient initially underwent incision and drainage abscess right foot of unknown etiology however should be noted patient has been experiencing signs and symptoms of neuropathy quite some time with borderline diabetes. He also has a history of alcoholic abuse. So most likely contributing factor to the neuropathy/etiology could be both EtOH abuse as well as diabetes. Physical exam demonstrates intact dorsalis pedis posterior tibial pulses. Protective sensation diminished. Wound appreciated dorsal aspect right foot approximately 3.5×4.5 cm with exposed muscle and tendon with approximately 20% devitalized nonviable tissue. Plantar aspect wound 3.5×1.5 cm with plantar musculature appreciated as well with approximately 10% devitalized nonviable deep tissue. Both areas are without any purulence or odor. The foot itself is with decreased erythema and edema. Risks were discussed with patient in detail which included but not limited to persistence, recurrence, delayed healing, chronic pain as well as loss of limb/life. Patient's fully aware that this is a salvage type procedure and ultimately more proximal type amputation may be warranted. Fully understood all and still wished to proceed.          PROCEDURE IN DETAIL:  Following satisfactory preop evaluation patient was brought to OR and placed on the OR table in supine

## 2018-09-12 NOTE — PROGRESS NOTES
erythematous. Erythema over distal leg. The foot is wrapped. Toes are pink and warm. Lines: Right PICC 9/10/18. Laboratory and Tests Review:  Lab Results   Component Value Date    WBC 7.4 09/11/2018    WBC 10.4 09/10/2018    WBC 10.7 09/09/2018    HGB 10.0 (L) 09/11/2018    HCT 31.4 (L) 09/11/2018    MCV 99.4 09/11/2018    PLT 65 (L) 09/11/2018     Lab Results   Component Value Date    NEUTROABS 8.73 (H) 09/08/2018    NEUTROABS 1.93 06/05/2018     Lab Results   Component Value Date    CRP 8.5 (H) 09/08/2018     No results found for: CRPHS  Lab Results   Component Value Date    SEDRATE 0 09/08/2018     Lab Results   Component Value Date    ALT 18 09/08/2018    AST 39 09/08/2018    ALKPHOS 63 09/08/2018    BILITOT 2.2 (H) 09/08/2018     Lab Results   Component Value Date     09/11/2018    K 3.7 09/11/2018     09/11/2018    CO2 23 09/11/2018    BUN 25 09/11/2018    CREATININE 0.9 09/11/2018    CREATININE 0.9 09/10/2018    CREATININE 0.8 09/09/2018    GFRAA >60 09/11/2018    LABGLOM >60 09/11/2018    GLUCOSE 132 09/11/2018    PROT 7.2 09/08/2018    LABALBU 3.0 09/08/2018    CALCIUM 8.2 09/11/2018    BILITOT 2.2 09/08/2018    ALKPHOS 63 09/08/2018    AST 39 09/08/2018    ALT 18 09/08/2018     Radiology:  CTA chest no emboli    Microbiology:   Blood cultures 9/8/18 MSSA in clusters in 1 of 4 bottles  Right foot tissue culture 9/9/18 MSSA  Wound culture right foot 9/8/18 MSSA  Right foot tissue culture 9/12/18 pending    Assessment:  · Right diabetic foot infection secondary to MSSA  · MSSA septicemia secondary to diabetic foot infection  · Cellulitis right foot  · Possible foot abscess      Plan:    · Continue Unasyn alone for a minimum of 2 weeks.  May need 4 weeks  · Patient to go to rehab    Marlin Promise  9/12/2018

## 2018-09-12 NOTE — PROGRESS NOTES
Occupational Therapy  OCCUPATIONAL THERAPY DAILY NOTE    Date:2018  Patient Name: Mark Mcdaniel  MRN: [id number]  : 1945  Room: [address] [place] [address]A     Patient Active Problem List   Diagnosis    Cellulitis of right lower extremity    Hypertension    Bacteremia       Subjective:  Pt was agreeable to tx. Precautions: Fall risk, NWB R foot, bed/ chair alarms  Chart Reviewed:  Yes          Independent Supervision Contact Guard Assist Minimal Assist Moderate Assist Maximum Assist Dependent   Feeding          Grooming          UE  Bathing          LE Bathing          UE Dressing          LE  Dressing                    Toileting               x    Comments:  Pt stood at [facility] during toilet hygiene. Functional Transfers:  Pt performed toilet transfer with Max x2, SPT Mod A x2,  Scooting to [facility] with CGA and sit to supine transfer with CGA. Educated pt on techniques to maintain NWB status during functional transfers. Therapeutic Exercises:  Pt demoed good use of BUE during functional transfers. Other:  Nurse stated pt was able to be seen after surgery on R foot wound today. Pt required max vc for safety during SPT.     Education:  Educated pt on techniques to maintain NWB status    Equipment Recommendations:  Continue to assess    AM-PAC Inpatient Daily Activity Raw Score:  15    Pain Level: 4/10   Additional Notes:  R foot    Lizbeth Dew VELASQUEZ/L [id number]    Total Tx Time: 15

## 2018-09-12 NOTE — PROGRESS NOTES
Subjective: The patient is awake and alert. Was in OR this AM for further wound clean out. Feels ok. Pain controlled. Cough minimal.  No dyspnea or chest pain. Diarrhea resolved. Objective:    BP (!) 173/67   Pulse 63   Temp 98.1 °F (36.7 °C) (Oral)   Resp 16   Ht 5' 9\" (1.753 m)   Wt 227 lb 5 oz (103.1 kg)   SpO2 99%   BMI 33.57 kg/m²     Current medications that patient is taking have been reviewed. Heart:  RRR, no murmurs, gallops, or rubs.   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

## 2018-09-12 NOTE — BRIEF OP NOTE
Brief Postoperative Note  ______________________________________________________________    Patient: Bud Hopson  YOB: 1945  MRN: [id number]  Date of Procedure: 9/12/2018    Pre-Op Diagnosis: -right foot wounds    Post-Op Diagnosis: Same       Procedure(s):  EXCISIONAL DEBRIDEMENT WOUNDS RIGHT FOOT    Anesthesia: Monitor Anesthesia Care    Surgeon(s):  Juju Hernández DPM    Staff:  Scrub Person First: Mary Jimenez     Estimated Blood Loss:      Complications: None    Specimens:   ID Type Source Tests Collected by Time Destination   1 : RIGHT FOOT SOFT TISSUE CULTURE  Tissue Foot FUNGUS CULTURE, GRAM STAIN, AFB STAIN, ACID FAST CULTURE, ANAEROBIC AND AEROBIC CULTURE Juju Hernández DPM 9/12/2018 0750    A : SOFT TISSUE RIGHT FOOT  Tissue Foot SURGICAL PATHOLOGY Juju Hernández DPM 9/12/2018 0748        Implants:  * No implants in log *      Drains:      Findings:     Juju Hernández DPM  Date: 9/12/2018  Time: 8:22 AM

## 2018-09-13 VITALS
SYSTOLIC BLOOD PRESSURE: 142 MMHG | HEART RATE: 77 BPM | WEIGHT: 229 LBS | OXYGEN SATURATION: 97 % | BODY MASS INDEX: 33.92 KG/M2 | DIASTOLIC BLOOD PRESSURE: 65 MMHG | HEIGHT: 69 IN | RESPIRATION RATE: 18 BRPM | TEMPERATURE: 98.6 F

## 2018-09-13 PROBLEM — R05.9 COUGH: Status: ACTIVE | Noted: 2018-09-13

## 2018-09-13 PROBLEM — D61.818 PANCYTOPENIA (HCC): Chronic | Status: ACTIVE | Noted: 2018-09-13

## 2018-09-13 PROBLEM — D73.1 HYPERSPLENISM: Chronic | Status: ACTIVE | Noted: 2018-09-13

## 2018-09-13 PROBLEM — E66.01 MORBID OBESITY (HCC): Chronic | Status: ACTIVE | Noted: 2018-09-13

## 2018-09-13 PROBLEM — E88.81 METABOLIC SYNDROME: Chronic | Status: ACTIVE | Noted: 2018-09-13

## 2018-09-13 PROBLEM — R78.81 BACTEREMIA: Status: RESOLVED | Noted: 2018-09-09 | Resolved: 2018-09-13

## 2018-09-13 LAB
ANION GAP SERPL CALCULATED.3IONS-SCNC: 11 MMOL/L (ref 7–16)
ANISOCYTOSIS: ABNORMAL
BASOPHILS ABSOLUTE: 0 E9/L (ref 0–0.2)
BASOPHILS RELATIVE PERCENT: 0 % (ref 0–2)
BUN BLDV-MCNC: 18 MG/DL (ref 8–23)
CALCIUM SERPL-MCNC: 8.3 MG/DL (ref 8.6–10.2)
CHLORIDE BLD-SCNC: 104 MMOL/L (ref 98–107)
CO2: 24 MMOL/L (ref 22–29)
CREAT SERPL-MCNC: 0.8 MG/DL (ref 0.7–1.2)
EOSINOPHILS ABSOLUTE: 0.23 E9/L (ref 0.05–0.5)
EOSINOPHILS RELATIVE PERCENT: 4 % (ref 0–6)
GFR AFRICAN AMERICAN: >60
GFR NON-AFRICAN AMERICAN: >60 ML/MIN/1.73
GLUCOSE BLD-MCNC: 116 MG/DL (ref 74–109)
GRAM STAIN ORDERABLE: NORMAL
HCT VFR BLD CALC: 33.8 % (ref 37–54)
HEMOGLOBIN: 10.7 G/DL (ref 12.5–16.5)
LYMPHOCYTES ABSOLUTE: 0.7 E9/L (ref 1.5–4)
LYMPHOCYTES RELATIVE PERCENT: 12 % (ref 20–42)
MCH RBC QN AUTO: 31.3 PG (ref 26–35)
MCHC RBC AUTO-ENTMCNC: 31.7 % (ref 32–34.5)
MCV RBC AUTO: 98.8 FL (ref 80–99.9)
METER GLUCOSE: 132 MG/DL (ref 70–110)
METER GLUCOSE: 95 MG/DL (ref 70–110)
MONOCYTES ABSOLUTE: 0.87 E9/L (ref 0.1–0.95)
MONOCYTES RELATIVE PERCENT: 15 % (ref 2–12)
NEUTROPHILS ABSOLUTE: 4 E9/L (ref 1.8–7.3)
NEUTROPHILS RELATIVE PERCENT: 69 % (ref 43–80)
PDW BLD-RTO: 16.7 FL (ref 11.5–15)
PLATELET # BLD: 79 E9/L (ref 130–450)
PLATELET CONFIRMATION: NORMAL
PMV BLD AUTO: 10.6 FL (ref 7–12)
POLYCHROMASIA: ABNORMAL
POTASSIUM SERPL-SCNC: 3.7 MMOL/L (ref 3.5–5)
RBC # BLD: 3.42 E12/L (ref 3.8–5.8)
SODIUM BLD-SCNC: 139 MMOL/L (ref 132–146)
WBC # BLD: 5.8 E9/L (ref 4.5–11.5)

## 2018-09-13 PROCEDURE — 6360000002 HC RX W HCPCS: Performed by: SPECIALIST

## 2018-09-13 PROCEDURE — 6370000000 HC RX 637 (ALT 250 FOR IP): Performed by: INTERNAL MEDICINE

## 2018-09-13 PROCEDURE — 2580000003 HC RX 258: Performed by: SPECIALIST

## 2018-09-13 PROCEDURE — 80048 BASIC METABOLIC PNL TOTAL CA: CPT

## 2018-09-13 PROCEDURE — 97530 THERAPEUTIC ACTIVITIES: CPT

## 2018-09-13 PROCEDURE — 85025 COMPLETE CBC W/AUTO DIFF WBC: CPT

## 2018-09-13 PROCEDURE — 36415 COLL VENOUS BLD VENIPUNCTURE: CPT

## 2018-09-13 PROCEDURE — G0009 ADMIN PNEUMOCOCCAL VACCINE: HCPCS | Performed by: INTERNAL MEDICINE

## 2018-09-13 PROCEDURE — 2580000003 HC RX 258: Performed by: INTERNAL MEDICINE

## 2018-09-13 PROCEDURE — 6360000002 HC RX W HCPCS: Performed by: INTERNAL MEDICINE

## 2018-09-13 PROCEDURE — 82962 GLUCOSE BLOOD TEST: CPT

## 2018-09-13 PROCEDURE — 90670 PCV13 VACCINE IM: CPT | Performed by: INTERNAL MEDICINE

## 2018-09-13 RX ORDER — DIPHENOXYLATE HYDROCHLORIDE AND ATROPINE SULFATE 2.5; .025 MG/1; MG/1
1 TABLET ORAL ONCE
Status: COMPLETED | OUTPATIENT
Start: 2018-09-13 | End: 2018-09-13

## 2018-09-13 RX ORDER — FLUTICASONE PROPIONATE 50 MCG
1 SPRAY, SUSPENSION (ML) NASAL DAILY
Qty: 1 BOTTLE | Refills: 3 | DISCHARGE
Start: 2018-09-13

## 2018-09-13 RX ORDER — LOSARTAN POTASSIUM 50 MG/1
50 TABLET ORAL DAILY
Qty: 30 TABLET | Refills: 3 | DISCHARGE
Start: 2018-09-14 | End: 2018-10-11 | Stop reason: ALTCHOICE

## 2018-09-13 RX ORDER — DIPHENOXYLATE HYDROCHLORIDE AND ATROPINE SULFATE 2.5; .025 MG/1; MG/1
1 TABLET ORAL 4 TIMES DAILY PRN
Qty: 10 TABLET | Refills: 0 | Status: SHIPPED | OUTPATIENT
Start: 2018-09-13 | End: 2018-09-23

## 2018-09-13 RX ORDER — CETIRIZINE HYDROCHLORIDE 10 MG/1
10 TABLET ORAL DAILY
DISCHARGE
Start: 2018-09-13

## 2018-09-13 RX ORDER — AMPICILLIN AND SULBACTAM 2; 1 G/1; G/1
3 INJECTION, POWDER, FOR SOLUTION INTRAMUSCULAR; INTRAVENOUS EVERY 6 HOURS
Qty: 40 EACH | Refills: 0 | DISCHARGE
Start: 2018-09-13 | End: 2018-09-28

## 2018-09-13 RX ORDER — GUAIFENESIN/DEXTROMETHORPHAN 100-10MG/5
5 SYRUP ORAL EVERY 4 HOURS PRN
Qty: 120 ML | DISCHARGE
Start: 2018-09-13 | End: 2018-09-23

## 2018-09-13 RX ORDER — FLUTICASONE PROPIONATE 50 MCG
1 SPRAY, SUSPENSION (ML) NASAL DAILY
Status: DISCONTINUED | OUTPATIENT
Start: 2018-09-13 | End: 2018-09-13 | Stop reason: HOSPADM

## 2018-09-13 RX ORDER — GUAIFENESIN/DEXTROMETHORPHAN 100-10MG/5
5 SYRUP ORAL EVERY 4 HOURS PRN
Status: DISCONTINUED | OUTPATIENT
Start: 2018-09-13 | End: 2018-09-13 | Stop reason: HOSPADM

## 2018-09-13 RX ORDER — HYDROCODONE BITARTRATE AND ACETAMINOPHEN 5; 325 MG/1; MG/1
1 TABLET ORAL EVERY 4 HOURS PRN
Qty: 10 TABLET | Refills: 0 | Status: SHIPPED | OUTPATIENT
Start: 2018-09-13 | End: 2018-09-20

## 2018-09-13 RX ORDER — LOSARTAN POTASSIUM 50 MG/1
50 TABLET ORAL DAILY
Status: DISCONTINUED | OUTPATIENT
Start: 2018-09-14 | End: 2018-09-13 | Stop reason: HOSPADM

## 2018-09-13 RX ORDER — CETIRIZINE HYDROCHLORIDE 10 MG/1
10 TABLET ORAL DAILY
Status: DISCONTINUED | OUTPATIENT
Start: 2018-09-13 | End: 2018-09-13 | Stop reason: HOSPADM

## 2018-09-13 RX ORDER — ALBUTEROL SULFATE 90 UG/1
2 AEROSOL, METERED RESPIRATORY (INHALATION) EVERY 6 HOURS PRN
Qty: 1 INHALER | Refills: 3 | DISCHARGE
Start: 2018-09-13

## 2018-09-13 RX ORDER — DIPHENOXYLATE HYDROCHLORIDE AND ATROPINE SULFATE 2.5; .025 MG/1; MG/1
1 TABLET ORAL 4 TIMES DAILY PRN
Status: DISCONTINUED | OUTPATIENT
Start: 2018-09-13 | End: 2018-09-13 | Stop reason: HOSPADM

## 2018-09-13 RX ADMIN — CETIRIZINE HYDROCHLORIDE 10 MG: 10 TABLET, FILM COATED ORAL at 11:27

## 2018-09-13 RX ADMIN — PNEUMOCOCCAL 13-VALENT CONJUGATE VACCINE 0.5 ML: 2.2; 2.2; 2.2; 2.2; 2.2; 4.4; 2.2; 2.2; 2.2; 2.2; 2.2; 2.2; 2.2 INJECTION, SUSPENSION INTRAMUSCULAR at 16:44

## 2018-09-13 RX ADMIN — FLUTICASONE PROPIONATE 1 SPRAY: 50 SPRAY, METERED NASAL at 11:27

## 2018-09-13 RX ADMIN — AMPICILLIN SODIUM AND SULBACTAM SODIUM 3 G: 2; 1 INJECTION, POWDER, FOR SOLUTION INTRAMUSCULAR; INTRAVENOUS at 16:44

## 2018-09-13 RX ADMIN — LISINOPRIL 10 MG: 10 TABLET ORAL at 09:33

## 2018-09-13 RX ADMIN — HYDROCODONE BITARTRATE AND ACETAMINOPHEN 1 TABLET: 5; 325 TABLET ORAL at 17:49

## 2018-09-13 RX ADMIN — BENZONATATE 100 MG: 100 CAPSULE ORAL at 09:33

## 2018-09-13 RX ADMIN — Medication 10 ML: at 16:44

## 2018-09-13 RX ADMIN — HYDROCODONE BITARTRATE AND ACETAMINOPHEN 1 TABLET: 5; 325 TABLET ORAL at 09:36

## 2018-09-13 RX ADMIN — DIPHENOXYLATE HYDROCHLORIDE AND ATROPINE SULFATE 1 TABLET: 2.5; .025 TABLET ORAL at 11:28

## 2018-09-13 RX ADMIN — Medication 300 UNITS: at 09:33

## 2018-09-13 RX ADMIN — AMPICILLIN SODIUM AND SULBACTAM SODIUM 3 G: 2; 1 INJECTION, POWDER, FOR SOLUTION INTRAMUSCULAR; INTRAVENOUS at 11:27

## 2018-09-13 RX ADMIN — GUAIFENESIN AND DEXTROMETHORPHAN 5 ML: 100; 10 SYRUP ORAL at 11:40

## 2018-09-13 RX ADMIN — Medication 10 ML: at 09:33

## 2018-09-13 RX ADMIN — Medication 10 ML: at 11:28

## 2018-09-13 RX ADMIN — AMPICILLIN SODIUM AND SULBACTAM SODIUM 3 G: 2; 1 INJECTION, POWDER, FOR SOLUTION INTRAMUSCULAR; INTRAVENOUS at 04:59

## 2018-09-13 RX ADMIN — METOPROLOL SUCCINATE 50 MG: 50 TABLET, EXTENDED RELEASE ORAL at 09:33

## 2018-09-13 ASSESSMENT — PAIN DESCRIPTION - ONSET: ONSET: GRADUAL

## 2018-09-13 ASSESSMENT — PAIN DESCRIPTION - ORIENTATION: ORIENTATION: RIGHT

## 2018-09-13 ASSESSMENT — PAIN SCALES - GENERAL
PAINLEVEL_OUTOF10: 1
PAINLEVEL_OUTOF10: 3
PAINLEVEL_OUTOF10: 9
PAINLEVEL_OUTOF10: 5

## 2018-09-13 ASSESSMENT — PAIN DESCRIPTION - PAIN TYPE: TYPE: ACUTE PAIN

## 2018-09-13 ASSESSMENT — PAIN DESCRIPTION - DESCRIPTORS: DESCRIPTORS: ACHING;DISCOMFORT;SORE

## 2018-09-13 ASSESSMENT — PAIN DESCRIPTION - LOCATION: LOCATION: LEG

## 2018-09-13 ASSESSMENT — PAIN DESCRIPTION - FREQUENCY: FREQUENCY: INTERMITTENT

## 2018-09-13 NOTE — PLAN OF CARE
Problem: Falls - Risk of:  Goal: Will remain free from falls  Will remain free from falls   Outcome: Ongoing      Problem: Pain:  Goal: Pain level will decrease  Pain level will decrease   Outcome: Ongoing      Problem: Infection - Surgical Site:  Goal: Will show no infection signs and symptoms  Will show no infection signs and symptoms   Outcome: Ongoing

## 2018-09-13 NOTE — PROGRESS NOTES
9/13/2018 9:26 AM   [facility]     Subjective:     Sam Noguera is a 68 y.o. patient seen in past by Dr Oliver Kingston at [facility] with a hx of thrombocytopenia felt to be secondary to ETOH abuse    Patient admitted with cellulitis lower ext  [facility] pos staph aureus   on Unasyn  Awake and alert  Afebrile   no complaints    Current meds:    lisinopril (PRINIVIL;ZESTRIL) tablet 10 mg Daily   loperamide (IMODIUM) capsule 2 mg 4x Daily PRN   insulin lispro (HUMALOG) injection vial 0-6 Units BID WC   acetaminophen (TYLENOL) tablet 650 mg Q4H PRN   glucose (GLUTOSE) 40 % oral gel 15 g PRN   dextrose 50 % solution 12.5 g PRN   glucagon (rDNA) injection 1 mg PRN   dextrose 5 % solution PRN   benzonatate (TESSALON) capsule 100 mg TID   sodium chloride flush 0.9 % injection 10 mL PRN   heparin flush 100 UNIT/ML injection 300 Units 2 times per day   heparin flush 100 UNIT/ML injection 300 Units PRN   HYDROcodone-acetaminophen (NORCO) 5-325 MG per tablet 1 tablet Q4H PRN   metoprolol succinate (TOPROL XL) extended release tablet 50 mg Daily   sodium chloride flush 0.9 % injection 10 mL 2 times per day   sodium chloride flush 0.9 % injection 10 mL PRN   magnesium hydroxide (MILK OF MAGNESIA) 400 MG/5ML suspension 30 mL Daily PRN   ondansetron (ZOFRAN) injection 4 mg Q6H PRN   albuterol sulfate  (90 Base) MCG/ACT inhaler 2 puff Q6H PRN   pneumococcal 13-valent conjugate (PREVNAR) injection 0.5 mL Prior to discharge   ampicillin-sulbactam (UNASYN) 3 g ivpb minibag Q6H     Todays labs:    Recent Labs      09/11/18   0400  09/13/18   0500   WBC  7.4  5.8   HGB  10.0*  10.7*   PLT  65*  79*   BUN  25*  18   CREATININE  0.9  0.8                                                     Objective:     Patient Vitals for the past 8 hrs:   Weight   09/13/18 0503 229 lb (103.9 kg)             Impression:     Active Problems:    Cellulitis of right lower extremity    Hypertension    Bacteremia  Resolved Problems:    * No resolved hospital problems.  *    Thrombocytopenia secondary to hypersplennism      Plan:     No active bleeding counts stable low   Will follow

## 2018-09-13 NOTE — PROGRESS NOTES
Daily Progress Note      SUBJECTIVE:  Up in chair, daughter in room, no complaints. OBJECTIVE:  Dressing intact, wounds dorsal and plantar right foot clean, no purulence, exposed muscle,tendon. Foot continues to improve decreased erythema and edema, no odor.      Medications    Current Facility-Administered Medications: cetirizine (ZYRTEC) tablet 10 mg, 10 mg, Oral, Daily  fluticasone (FLONASE) 50 MCG/ACT nasal spray 1 spray, 1 spray, Each Nare, Daily  diphenoxylate-atropine (LOMOTIL) 2.5-0.025 MG per tablet 1 tablet, 1 tablet, Oral, 4x Daily PRN  [START ON 9/14/2018] losartan (COZAAR) tablet 50 mg, 50 mg, Oral, Daily  guaiFENesin-dextromethorphan (ROBITUSSIN DM) 100-10 MG/5ML syrup 5 mL, 5 mL, Oral, Q4H PRN  insulin lispro (HUMALOG) injection vial 0-6 Units, 0-6 Units, Subcutaneous, BID WC  acetaminophen (TYLENOL) tablet 650 mg, 650 mg, Oral, Q4H PRN  glucose (GLUTOSE) 40 % oral gel 15 g, 15 g, Oral, PRN  dextrose 50 % solution 12.5 g, 12.5 g, Intravenous, PRN  glucagon (rDNA) injection 1 mg, 1 mg, Intramuscular, PRN  dextrose 5 % solution, 100 mL/hr, Intravenous, PRN  sodium chloride flush 0.9 % injection 10 mL, 10 mL, Intravenous, PRN  heparin flush 100 UNIT/ML injection 300 Units, 3 mL, Intravenous, 2 times per day  heparin flush 100 UNIT/ML injection 300 Units, 3 mL, Intercatheter, PRN  HYDROcodone-acetaminophen (NORCO) 5-325 MG per tablet 1 tablet, 1 tablet, Oral, Q4H PRN  metoprolol succinate (TOPROL XL) extended release tablet 50 mg, 50 mg, Oral, Daily  sodium chloride flush 0.9 % injection 10 mL, 10 mL, Intravenous, 2 times per day  sodium chloride flush 0.9 % injection 10 mL, 10 mL, Intravenous, PRN  magnesium hydroxide (MILK OF MAGNESIA) 400 MG/5ML suspension 30 mL, 30 mL, Oral, Daily PRN  ondansetron (ZOFRAN) injection 4 mg, 4 mg, Intravenous, Q6H PRN  albuterol sulfate  (90 Base) MCG/ACT inhaler 2 puff, 2 puff, Inhalation, Q6H PRN  pneumococcal 13-valent conjugate (PREVNAR) injection 0.5 mL,

## 2018-09-13 NOTE — CARE COORDINATION
Social Work / Discharge Planning : Patient will be discharged to [place] [facility] today at 7:00 pm  via "[employer] Patient, daughter, Josephine Menjivar, charge RN,Rn and Kyra Frankel from [facility] [place] updated on discharge and time of transport. SW to follow.  Electronically signed by JUAN Martinez on 9/13/2018 at 2:46 PM

## 2018-09-13 NOTE — PROGRESS NOTES
Occupational Therapy  Occupational Therapy Treatment Note    Date:2018  Patient Name: Aaliyah Starks  MRN: [id number]  : 1945  Room: [address] [place] [address]A     Patient Active Problem List   Diagnosis    Cellulitis of right lower extremity    Hypertension    Bacteremia       Subjective:  Patient supine in bed upon this OTR's arrival to patient's room; patient agreeable to participate in OT treatment session. Patient seated in bedside chair with call light in reach, chair alarm activated, and R LE elevated upon conclusion of this session. Precautions: falls risk; NWB R LE; bed/chair alarms  Chart Reviewed:  Yes          Independent Supervision/SBA CGA Min Assist Mod Assist Max Assist Dependent   Feeding          Grooming          UE Bathing          LE Bathing          UE Dressing          LE Dressing    X  (to don L shoe while seated at edge of bed)      Toileting            AM-PAC Daily Activity Inpatient   How much help for putting on and taking off regular lower body clothing?: A Lot  How much help for Bathing?: A Lot  How much help for Toileting?: A Lot  How much help for putting on and taking off regular upper body clothing?: A Little  How much help for taking care of personal grooming?: A Little  How much help for eating meals?: None  AM-[facility] Inpatient Daily Activity Raw Score: 16  AM-PAC Inpatient ADL T-Scale Score : 35.96  ADL Inpatient CMS 0-100% Score: 53.32  ADL Inpatient CMS G-Code Modifier : CK    Comments:  Min A given to don L shoe while seated at edge of bed. Bed Mobility and Functional Transfers/Mobility:  SBA for supine-to-sit; mild shortness of breath demonstrated with bed mobility. Max Ax2 for sit-to-stand from edge of bed; cues given to maximize safety with functional transfer. Mod Ax2 given initially for hops forward with walker; cues given to maximize adherence to NWB status with R LE with functional transfers/mobility.  As patient demonstrated fatigue, Max Ax2 required during second half of functional mobility (with walker). Assistance needed with management of walker to maximize safety. Shortness of breath demonstrated with functional mobility. Other:  Patient demonstrates decreased independence with ADLs/IADLs, decreased activity tolerance, decreased independence with bed mobility, decreased independence with functional mobility/transfers, decreased standing balance, and decreased strength. Patient would benefit from continued OT services upon discharge in order to maximize independence/safety with ADLs/IADLs, functional transfers/mobility, and other functional tasks of choice in order to facilitate improved occupational performance. Education:  Patient education provided regardin) safe transfer techniques, 2) importance of having assist with functional mobility/transfers to minimize falls risk, 3) potential benefits of OT services, 4) OT POC, 5) techniques to maximize adherence to NWB status with R LE, 6) techniques to maximize safety with management of walker. Patient demonstrated Fair+ understanding. Equipment Recommendations:  Continue to assess. Pain Level: No complaints/indications of pain verbalized/demonstrated; patient noted that he had recently received pain medication from nursing. Patient demonstrates Good rehab potential to progress toward goals of this OT POC. [x] Continue with current OT plan of care. [] Prepare for Discharge    Rosaura Mccain OTR/L  License Number: [id number]    Total Treatment Time: 20 minutes

## 2018-09-13 NOTE — DISCHARGE SUMMARY
Physician Discharge Summary     Patient ID:  Mary Kay Yu  [id number]  22 y.o.  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 white man followed by Dr Katelynn Tian who presents secondary to right foot/leg swelling and redness. Clinical exam was consistent with cellulitis. He was admitted and evaluated by podiatry and ID. IV antibiotics were started. He underwent I&D of the wound. Blood and wound cultures demonstrated MSSA and antibiotics were adjusted accordingly. He required 2nd clean out of the foot while in the hospital.  Due to non productive cough and shortness of breath, CT of the chest was completed which was negative for infiltrate/PE. Hematology evaluated due to anemia/thrombocytopenia. He had recent outpatient evaluation demonstrating hypersplenism, thought to be due to liver disease from prior alcohol abuse. Counts remained stable during the stay. Once cleared by consultants and once antibiotics were finalized by ID, the patient was discharged to [facility] for further care.     Discharge Exam:  See progress note from today    Disposition: [facility]    Patient Instructions:   Current Discharge Medication List      START taking these medications Details   HYDROcodone-acetaminophen (NORCO) 5-325 MG per tablet Take 1 tablet by mouth every 4 hours as needed for Pain for up to 7 days. Godfrey Lanele: 10 tablet, Refills: 0    Associated Diagnoses: Cellulitis of right lower extremity      diphenoxylate-atropine (LOMOTIL) 2.5-0.025 MG per tablet Take 1 tablet by mouth 4 times daily as needed for Diarrhea for up to 10 days. Godfrey Jayy: 10 tablet, Refills: 0    Associated Diagnoses: Diarrhea, unspecified type      cetirizine (ZYRTEC) 10 MG tablet Take 1 tablet by mouth daily      losartan (COZAAR) 50 MG tablet Take 1 tablet by mouth daily  Qty: 30 tablet, Refills: 3      guaiFENesin-dextromethorphan (ROBITUSSIN DM) 100-10 MG/5ML syrup Take 5 mLs by mouth every 4 hours as needed for Cough  Qty: 120 mL      fluticasone (FLONASE) 50 MCG/ACT nasal spray 1 spray by Each Nare route daily  Qty: 1 Bottle, Refills: 3      ampicillin-sulbactam (UNASYN 3GM) 3 (2-1) g SOLR Infuse 3 g intravenously every 6 hours for 15 days  Qty: 40 each, Refills: 0         CONTINUE these medications which have CHANGED    Details   albuterol sulfate HFA (PROAIR HFA) 108 (90 Base) MCG/ACT inhaler Inhale 2 puffs into the lungs every 6 hours as needed for Wheezing  Qty: 1 Inhaler, Refills: 3         CONTINUE these medications which have NOT CHANGED    Details   vitamin B-12 (CYANOCOBALAMIN) 100 MCG tablet Take 100 mcg by mouth daily      Turmeric 400 MG CAPS Take 400 mg by mouth      vitamin B-6 (PYRIDOXINE) 100 MG tablet Take 100 mg by mouth daily      Multiple Vitamins-Minerals (VISION-JERED PRESERVE PO) Take 1 tablet by mouth      Multiple Vitamin (MULTI-VITAMIN DAILY PO) Take by mouth      metoprolol succinate (TOPROL XL) 50 MG extended release tablet Take 50 mg by mouth daily             Activity: activity as tolerated and non weight bearing of right foot per podiatry  Diet: regular diet    Follow-up with house doctor at [place] [id number] in 1 week.     Note that over 30 minutes was spent in preparing discharge papers,

## 2018-09-13 NOTE — PROGRESS NOTES
Subjective: The patient is awake and alert. Feels ok but continues to have diarrhea and cough. Current medications not helping these symptoms. Foot pain reasonably controlled. No chest pain. Remains very short of breath with exertion. Objective:    BP (!) 150/72 Comment: manual  Pulse 77   Temp 98 °F (36.7 °C) (Oral)   Resp 18   Ht 5' 9\" (1.753 m)   Wt 229 lb (103.9 kg)   SpO2 99%   BMI 33.82 kg/m²     Current medications that patient is taking have been reviewed. Heart:  RRR, no murmurs, gallops, or rubs.   Lungs:  Clear bilaterally, no wheeze, rales or rhonchi  Abd: bowel sounds present, nontender, nondistended, no masses  Extrem:  No clubbing or cyanosis, 2+ right leg edema, right foot covered with surgical dressing not removed at request of podiatry    CBC:   Lab Results   Component Value Date    WBC 5.8 09/13/2018    RBC 3.42 09/13/2018    HGB 10.7 09/13/2018    HCT 33.8 09/13/2018    MCV 98.8 09/13/2018    MCH 31.3 09/13/2018    MCHC 31.7 09/13/2018    RDW 16.7 09/13/2018    PLT 79 09/13/2018    MPV 10.6 09/13/2018     BMP:    Lab Results   Component Value Date     09/13/2018    K 3.7 09/13/2018     09/13/2018    CO2 24 09/13/2018    BUN 18 09/13/2018    CREATININE 0.8 09/13/2018    CALCIUM 8.3 09/13/2018    GFRAA >60 09/13/2018    LABGLOM >60 09/13/2018    GLUCOSE 116 09/13/2018         Assessment:    Patient Active Problem List   Diagnosis    Cellulitis of right lower extremity with abscess-s/p I&D    Staph (MSSA bacteremia)    Hypertension, benign    Hyperglycemia-due to metabolic syndrome    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

## 2018-09-13 NOTE — PROGRESS NOTES
[address] [place] [address] [facility]  Progress Note    SUBJECTIVE:  Chief Complaint   Patient presents with    Leg Swelling     right leg- redness and swelling worsening over the past week     Foot Pain     right foot blistering      Patient is tolerating medications. No reported adverse drug reactions. No nausea, vomiting, diarrhea. Remains afebrile. Denies any dyspnea or cough. No itching or skin rashes. Minimal pain. Review of systems:  As stated above in the chief complaint, otherwise negative. Medications:  Scheduled Meds:   cetirizine  10 mg Oral Daily    fluticasone  1 spray Each Nare Daily    [START ON 2018] losartan  50 mg Oral Daily    insulin lispro  0-6 Units Subcutaneous BID WC    heparin flush  3 mL Intravenous 2 times per day    metoprolol succinate  50 mg Oral Daily    sodium chloride flush  10 mL Intravenous 2 times per day    pneumococcal 13-valent conjugate  0.5 mL Intramuscular Prior to discharge    ampicillin-sulbactam  3 g Intravenous Q6H     Continuous Infusions:   dextrose       PRN Meds:diphenoxylate-atropine, guaiFENesin-dextromethorphan, acetaminophen, glucose, dextrose, glucagon (rDNA), dextrose, sodium chloride flush, heparin flush, HYDROcodone 5 mg - acetaminophen, sodium chloride flush, magnesium hydroxide, ondansetron, albuterol sulfate HFA    OBJECTIVE:  BP (!) 150/72 Comment: manual  Pulse 77   Temp 98 °F (36.7 °C) (Oral)   Resp 18   Ht 5' 9\" (1.753 m)   Wt 229 lb (103.9 kg)   SpO2 99%   BMI 33.82 kg/m²   Temp  Av.2 °F (36.8 °C)  Min: 98 °F (36.7 °C)  Max: 98.3 °F (36.8 °C)  Constitutional: The patient is awake, alert, and oriented. Daughter present. Skin: Warm and dry. No rashes were noted. HEENT: No jaundice. Moist mucous membranes, no ulcerations, no thrush. Neck: Supple to movements. Chest: No wheezing, crackles, or rhonchi. Cardiovascular: S1 and S2 are rhythmic and regular. No murmurs appreciated.    Abdomen:

## 2018-09-14 LAB
BLOOD CULTURE, ROUTINE: ABNORMAL
BLOOD CULTURE, ROUTINE: NORMAL
CULTURE, BLOOD 2: ABNORMAL
CULTURE, BLOOD 2: ABNORMAL
CULTURE, BLOOD 2: NORMAL
ORGANISM: ABNORMAL
ORGANISM: ABNORMAL

## 2018-09-15 LAB
CULTURE SURGICAL: NORMAL
GRAM STAIN RESULT: NORMAL

## 2018-09-17 ENCOUNTER — HOSPITAL ENCOUNTER (OUTPATIENT)
Age: 73
Discharge: HOME | End: 2018-09-19
Payer: COMMERCIAL

## 2018-09-17 LAB
ALBUMIN SERPL-MCNC: 2.3 G/DL (ref 3.5–5.2)
ALP BLD-CCNC: 81 U/L (ref 40–129)
ALT SERPL-CCNC: 18 U/L (ref 0–40)
ANAEROBIC CULTURE: NORMAL
ANION GAP SERPL CALCULATED.3IONS-SCNC: 11 MMOL/L (ref 7–16)
AST SERPL-CCNC: 37 U/L (ref 0–39)
BASOPHILS ABSOLUTE: 0.03 E9/L (ref 0–0.2)
BASOPHILS RELATIVE PERCENT: 0.5 % (ref 0–2)
BILIRUB SERPL-MCNC: 0.7 MG/DL (ref 0–1.2)
BUN BLDV-MCNC: 8 MG/DL (ref 8–23)
CALCIUM SERPL-MCNC: 8.3 MG/DL (ref 8.6–10.2)
CHLORIDE BLD-SCNC: 100 MMOL/L (ref 98–107)
CO2: 27 MMOL/L (ref 22–29)
CREAT SERPL-MCNC: 0.7 MG/DL (ref 0.7–1.2)
EOSINOPHILS ABSOLUTE: 0.17 E9/L (ref 0.05–0.5)
EOSINOPHILS RELATIVE PERCENT: 2.7 % (ref 0–6)
GFR AFRICAN AMERICAN: >60
GFR NON-AFRICAN AMERICAN: >60 ML/MIN/1.73
GLUCOSE BLD-MCNC: 117 MG/DL (ref 74–109)
HCT VFR BLD CALC: 33.5 % (ref 37–54)
HEMOGLOBIN: 10.4 G/DL (ref 12.5–16.5)
IMMATURE GRANULOCYTES #: 0.06 E9/L
IMMATURE GRANULOCYTES %: 0.9 % (ref 0–5)
LYMPHOCYTES ABSOLUTE: 0.88 E9/L (ref 1.5–4)
LYMPHOCYTES RELATIVE PERCENT: 13.9 % (ref 20–42)
MCH RBC QN AUTO: 31 PG (ref 26–35)
MCHC RBC AUTO-ENTMCNC: 31 % (ref 32–34.5)
MCV RBC AUTO: 100 FL (ref 80–99.9)
MONOCYTES ABSOLUTE: 0.67 E9/L (ref 0.1–0.95)
MONOCYTES RELATIVE PERCENT: 10.6 % (ref 2–12)
NEUTROPHILS ABSOLUTE: 4.52 E9/L (ref 1.8–7.3)
NEUTROPHILS RELATIVE PERCENT: 71.4 % (ref 43–80)
PDW BLD-RTO: 17.2 FL (ref 11.5–15)
PLATELET # BLD: 94 E9/L (ref 130–450)
PLATELET CONFIRMATION: NORMAL
PMV BLD AUTO: 10.4 FL (ref 7–12)
POTASSIUM SERPL-SCNC: 3.9 MMOL/L (ref 3.5–5)
RBC # BLD: 3.35 E12/L (ref 3.8–5.8)
SODIUM BLD-SCNC: 138 MMOL/L (ref 132–146)
TOTAL PROTEIN: 6.5 G/DL (ref 6.4–8.3)
WBC # BLD: 6.3 E9/L (ref 4.5–11.5)

## 2018-09-17 PROCEDURE — 36415 COLL VENOUS BLD VENIPUNCTURE: CPT

## 2018-09-17 PROCEDURE — 80053 COMPREHEN METABOLIC PANEL: CPT

## 2018-09-17 PROCEDURE — 85025 COMPLETE CBC W/AUTO DIFF WBC: CPT

## 2018-09-18 ENCOUNTER — HOSPITAL ENCOUNTER (OUTPATIENT)
Age: 73
Discharge: HOME | End: 2018-09-20
Payer: COMMERCIAL

## 2018-09-18 LAB
ALBUMIN SERPL-MCNC: 2.4 G/DL (ref 3.5–5.2)
ALP BLD-CCNC: 95 U/L (ref 40–129)
ALT SERPL-CCNC: 16 U/L (ref 0–40)
ANION GAP SERPL CALCULATED.3IONS-SCNC: 14 MMOL/L (ref 7–16)
AST SERPL-CCNC: 32 U/L (ref 0–39)
BILIRUB SERPL-MCNC: 0.6 MG/DL (ref 0–1.2)
BUN BLDV-MCNC: 8 MG/DL (ref 8–23)
CALCIUM SERPL-MCNC: 8.6 MG/DL (ref 8.6–10.2)
CHLORIDE BLD-SCNC: 99 MMOL/L (ref 98–107)
CO2: 25 MMOL/L (ref 22–29)
CREAT SERPL-MCNC: 0.7 MG/DL (ref 0.7–1.2)
GFR AFRICAN AMERICAN: >60
GFR NON-AFRICAN AMERICAN: >60 ML/MIN/1.73
GLUCOSE BLD-MCNC: 130 MG/DL (ref 74–109)
HCT VFR BLD CALC: 33.4 % (ref 37–54)
HEMOGLOBIN: 10.4 G/DL (ref 12.5–16.5)
MCH RBC QN AUTO: 30.9 PG (ref 26–35)
MCHC RBC AUTO-ENTMCNC: 31.1 % (ref 32–34.5)
MCV RBC AUTO: 99.1 FL (ref 80–99.9)
PDW BLD-RTO: 16.9 FL (ref 11.5–15)
PLATELET # BLD: 89 E9/L (ref 130–450)
PLATELET CONFIRMATION: NORMAL
PMV BLD AUTO: 10.4 FL (ref 7–12)
POTASSIUM SERPL-SCNC: 3.8 MMOL/L (ref 3.5–5)
RBC # BLD: 3.37 E12/L (ref 3.8–5.8)
SODIUM BLD-SCNC: 138 MMOL/L (ref 132–146)
TOTAL PROTEIN: 6.6 G/DL (ref 6.4–8.3)
WBC # BLD: 5.3 E9/L (ref 4.5–11.5)

## 2018-09-18 PROCEDURE — 80053 COMPREHEN METABOLIC PANEL: CPT

## 2018-09-18 PROCEDURE — 36415 COLL VENOUS BLD VENIPUNCTURE: CPT

## 2018-09-18 PROCEDURE — 85027 COMPLETE CBC AUTOMATED: CPT

## 2018-09-20 ENCOUNTER — HOSPITAL ENCOUNTER (OUTPATIENT)
Dept: WOUND CARE | Age: 73
Discharge: HOME | End: 2018-09-20
Payer: MEDICARE

## 2018-09-20 ENCOUNTER — HOSPITAL ENCOUNTER (OUTPATIENT)
Age: 73
Discharge: HOME | End: 2018-09-22
Payer: COMMERCIAL

## 2018-09-20 VITALS
BODY MASS INDEX: 33.92 KG/M2 | RESPIRATION RATE: 18 BRPM | HEART RATE: 76 BPM | DIASTOLIC BLOOD PRESSURE: 68 MMHG | SYSTOLIC BLOOD PRESSURE: 138 MMHG | WEIGHT: 229 LBS | TEMPERATURE: 98.4 F | HEIGHT: 69 IN

## 2018-09-20 LAB
ALBUMIN SERPL-MCNC: 2.6 G/DL (ref 3.5–5.2)
ALP BLD-CCNC: 79 U/L (ref 40–129)
ALT SERPL-CCNC: 15 U/L (ref 0–40)
ANION GAP SERPL CALCULATED.3IONS-SCNC: 16 MMOL/L (ref 7–16)
AST SERPL-CCNC: 30 U/L (ref 0–39)
BASOPHILS ABSOLUTE: 0.05 E9/L (ref 0–0.2)
BASOPHILS RELATIVE PERCENT: 0.8 % (ref 0–2)
BILIRUB SERPL-MCNC: 0.7 MG/DL (ref 0–1.2)
BUN BLDV-MCNC: 8 MG/DL (ref 8–23)
C-REACTIVE PROTEIN: 1.3 MG/DL (ref 0–0.4)
CALCIUM SERPL-MCNC: 8.6 MG/DL (ref 8.6–10.2)
CHLORIDE BLD-SCNC: 99 MMOL/L (ref 98–107)
CO2: 24 MMOL/L (ref 22–29)
CREAT SERPL-MCNC: 0.6 MG/DL (ref 0.7–1.2)
EOSINOPHILS ABSOLUTE: 0.23 E9/L (ref 0.05–0.5)
EOSINOPHILS RELATIVE PERCENT: 3.6 % (ref 0–6)
GFR AFRICAN AMERICAN: >60
GFR NON-AFRICAN AMERICAN: >60 ML/MIN/1.73
GLUCOSE BLD-MCNC: 100 MG/DL (ref 74–109)
HCT VFR BLD CALC: 34.9 % (ref 37–54)
HEMOGLOBIN: 10.9 G/DL (ref 12.5–16.5)
IMMATURE GRANULOCYTES #: 0.04 E9/L
IMMATURE GRANULOCYTES %: 0.6 % (ref 0–5)
LYMPHOCYTES ABSOLUTE: 0.93 E9/L (ref 1.5–4)
LYMPHOCYTES RELATIVE PERCENT: 14.4 % (ref 20–42)
MCH RBC QN AUTO: 31.1 PG (ref 26–35)
MCHC RBC AUTO-ENTMCNC: 31.2 % (ref 32–34.5)
MCV RBC AUTO: 99.7 FL (ref 80–99.9)
MONOCYTES ABSOLUTE: 0.76 E9/L (ref 0.1–0.95)
MONOCYTES RELATIVE PERCENT: 11.8 % (ref 2–12)
NEUTROPHILS ABSOLUTE: 4.43 E9/L (ref 1.8–7.3)
NEUTROPHILS RELATIVE PERCENT: 68.8 % (ref 43–80)
PDW BLD-RTO: 17 FL (ref 11.5–15)
PLATELET # BLD: 108 E9/L (ref 130–450)
PMV BLD AUTO: 10.8 FL (ref 7–12)
POTASSIUM SERPL-SCNC: 4.1 MMOL/L (ref 3.5–5)
RBC # BLD: 3.5 E12/L (ref 3.8–5.8)
SEDIMENTATION RATE, ERYTHROCYTE: 60 MM/HR (ref 0–15)
SODIUM BLD-SCNC: 139 MMOL/L (ref 132–146)
TOTAL PROTEIN: 7.2 G/DL (ref 6.4–8.3)
WBC # BLD: 6.4 E9/L (ref 4.5–11.5)

## 2018-09-20 PROCEDURE — 99214 OFFICE O/P EST MOD 30 MIN: CPT

## 2018-09-20 PROCEDURE — 11043 DBRDMT MUSC&/FSCA 1ST 20/<: CPT

## 2018-09-20 PROCEDURE — 85025 COMPLETE CBC W/AUTO DIFF WBC: CPT

## 2018-09-20 PROCEDURE — 80053 COMPREHEN METABOLIC PANEL: CPT

## 2018-09-20 PROCEDURE — 36415 COLL VENOUS BLD VENIPUNCTURE: CPT

## 2018-09-20 PROCEDURE — 86140 C-REACTIVE PROTEIN: CPT

## 2018-09-20 PROCEDURE — 85651 RBC SED RATE NONAUTOMATED: CPT

## 2018-09-20 PROCEDURE — G0463 HOSPITAL OUTPT CLINIC VISIT: HCPCS

## 2018-09-20 NOTE — PROGRESS NOTES
[facility]  History and Physical/Consultation  Podiatry    Referring Physician : Joshua Tuttle MD  Connie Lang  MEDICAL RECORD NUMBER:  [id number]  AGE: 68 y.o. GENDER: male  : 1945  EPISODE DATE:  2018  Subjective:     Chief Complaint   Patient presents with    Wound Check     RT FOOT         HISTORY of PRESENT ILLNESS HPI     Connie Lang is a 68 y.o. male who presents today for wound/ulcer evaluation. Patient currently at [facility]. Doing well. Tolerating IV antibiotics. He denies any pain as far as the foot. Nursing with dressing changes as instructed. She recently discharged from the hospital to [facility] after undergoing incision and drainage of an abscess right foot. Pt is currently on abx.       Wound/Ulcer Pain Timing/Severity: none  Quality of pain: N/A  Severity:  0 / 10   Modifying Factors: None  Associated Signs/Symptoms: none    Ulcer Identification:  Ulcer Type: non-healing surgical  Contributing Factors: diabetes and Neuropathy    Diabetic/Pressure/Non Pressure Ulcers onl y:  Ulcer: N/A    If patient has diabetic lower extremity wounds  Lundberg Classification of diabetic lower extremity wounds:    Grade Description   []  0 No open wound   []  1 Superficial ulcer involving the full skin thickness   []  2 Deep ulcer involves ligament, tendon, joint capsule, or fascia  No bone involvement or abscess presence   []  3 Deep Ulcer with abcess formation and/or osteomyelitis   []  4 Localized gangrene   []  5 Extensive gangrene of the foot     Wound: N/A        PAST MEDICAL HISTORY      Diagnosis Date    Arthritis     Cancer ([address]     COLON CA    Hypertension     Neuropathy      Past Surgical History:   Procedure Laterality Date    COLECTOMY      COLONOSCOPY  2016    COLOSTOMY      EYE SURGERY      CATARACT OR    WI DEBRIDEMENT, SKIN, SUB-Q TISSUE,MUSCLE,BONE,=<20 SQ CM Right 2018    INCISION AND DRAINAGE ABSCESS RIGHT FOOT performed by Gordo Raya Teo Valdez at [address], SKIN, SUB-Q TISSUE,MUSCLE,BONE,=<20 SQ CM Right 9/12/2018    DEBRIDEMENT WOUND RIGHT FOOT performed by Juju Hernández DPM at [address] [place], [place] [address]hans Ya  2005     History reviewed. No pertinent family history. Social History   Substance Use Topics    Smoking status: Former Smoker    Smokeless tobacco: Never Used      Comment: quit in 1990     Alcohol use 21.6 oz/week     36 Cans of beer per week     No Known Allergies  Current Outpatient Prescriptions on File Prior to Encounter   Medication Sig Dispense Refill    HYDROcodone-acetaminophen (NORCO) 5-325 MG per tablet Take 1 tablet by mouth every 4 hours as needed for Pain for up to 7 days. . 10 tablet 0    albuterol sulfate HFA (PROAIR HFA) 108 (90 Base) MCG/ACT inhaler Inhale 2 puffs into the lungs every 6 hours as needed for Wheezing 1 Inhaler 3    diphenoxylate-atropine (LOMOTIL) 2.5-0.025 MG per tablet Take 1 tablet by mouth 4 times daily as needed for Diarrhea for up to 10 days. . 10 tablet 0    cetirizine (ZYRTEC) 10 MG tablet Take 1 tablet by mouth daily      losartan (COZAAR) 50 MG tablet Take 1 tablet by mouth daily 30 tablet 3    guaiFENesin-dextromethorphan (ROBITUSSIN DM) 100-10 MG/5ML syrup Take 5 mLs by mouth every 4 hours as needed for Cough 120 mL     fluticasone (FLONASE) 50 MCG/ACT nasal spray 1 spray by Each Nare route daily 1 Bottle 3    ampicillin-sulbactam (UNASYN 3GM) 3 (2-1) g SOLR Infuse 3 g intravenously every 6 hours for 15 days 40 each 0    vitamin B-12 (CYANOCOBALAMIN) 100 MCG tablet Take 100 mcg by mouth daily      Turmeric 400 MG CAPS Take 400 mg by mouth      vitamin B-6 (PYRIDOXINE) 100 MG tablet Take 100 mg by mouth daily      Multiple Vitamins-Minerals (VISION-JERED PRESERVE PO) Take 1 tablet by mouth      Multiple Vitamin (MULTI-VITAMIN DAILY PO) Take by mouth      metoprolol succinate (TOPROL XL) 50 MG extended release tablet Take 50 mg by mouth daily       No current 9/20/2018 11:34 AM   Sabiha-wound Assessment Intact 9/20/2018 11:34 AM   Time out Yes 9/20/2018 11:54 AM   Number of days: 0       Incision 09/09/18 Foot Right (Active)   Wound Assessment SUBHA 9/12/2018  8:40 PM   Sabiha-wound Assessment SUBHA 9/12/2018  8:40 PM   Closure SUBHA 9/12/2018  8:40 PM   Drainage Amount None 9/12/2018  8:40 PM   Dressing/Treatment Dry dressing; Ace Wrap 9/12/2018  8:40 PM   Dressing Changed Changed/New 9/9/2018  1:36 PM   Dressing Status Clean;Dry; Intact 9/13/2018  9:40 AM   Number of days: 10       Incision 09/12/18 Foot Right (Active)   Wound Assessment SUBHA 9/12/2018  8:40 PM   Sabiha-wound Assessment SUBHA 9/12/2018  8:40 PM   Closure SUBHA 9/12/2018  8:40 PM   Drainage Amount None 9/12/2018  8:40 PM   Dressing/Treatment Dry dressing; Ace Wrap 9/12/2018  8:40 PM   Dressing Status Clean;Dry; Intact 9/13/2018  9:40 AM   Number of days: 8       Percent of Wound/Ulcer Debrided: 50%    Total Surface Area Debrided:  13 sq cm     Estimated Blood Loss:  Minimal    Hemostasis Achieved:  by pressure    Procedural Pain:  0  / 10     Post Procedural Pain:  0 / 10     Response to treatment:  Well tolerated by patient. A culture was not done. Plan:       Treatment Note please see attached Discharge Instructions    Written patient dismissal instructions given to patient and signed by patient or POA. Discharge Instructions       Visit Discharge/Physician Orders    Discharge condition: Stable    Assessment of pain at discharge: NONE    Anesthetic used: LIDOCAINE 2%    Discharge to: ECF    Left via:ambulance    Accompanied by: SELF    ECF/[facility]: [facility]    Dressing Orders:WOUND LOCATION RIGHT FOOT WOUNDS CLEANSE WOUND WITH NORMAL SALINE PACK LIGHTLY WITH 1/4 [employer] MOIST TO MOIST DRESSING, COVER WITH A DRY DRESSING AND SECURE. CHANGE TWO TIMES DAILY. Treatment Orders:FOLLOW A NUTRITIOUS DIET HIGH IN PROTEIN AND VITAMIN C TO PROMOTE WOUND HEALING.  TAKE A MULTIVITAMIN DAILY IF OK WITH PCP    CONTACT  FOR OFFLOADING SHOE TO RIGHT FOOT. CALL DR Amadou Christensen WHEN OBTAINED. PATIENT FOLLOWS WITH INFECTIOUS DISEASE / IV ANTIBIOTICS AS ORDERED     NO WEIGHT TO RIGHT FOOT. [facility] followup visit ___1 WEEK__________________________  (Please note your next appointment above and if you are unable to keep, kindly give a 24 hour notice. Thank you.)    Physician signature:__________________________      If you experience any of the following, please call the [facility] during business hours:    * Increase in Pain  * Temperature over 101  * Increase in drainage from your wound  * Drainage with a foul odor  * Bleeding  * Increase in swelling  * Need for compression bandage changes due to slippage, breakthrough drainage. If you need medical attention outside of the business hours of the [facility] please contact your PCP or go to the nearest emergency room.         Electronically signed by Caroline Moon DPM on 9/20/2018 at 12:18 PM

## 2018-09-24 ENCOUNTER — HOSPITAL ENCOUNTER (OUTPATIENT)
Age: 73
Discharge: HOME | End: 2018-09-26
Payer: COMMERCIAL

## 2018-09-24 LAB
ALBUMIN SERPL-MCNC: 2.6 G/DL (ref 3.5–5.2)
ALP BLD-CCNC: 74 U/L (ref 40–129)
ALT SERPL-CCNC: 13 U/L (ref 0–40)
ANION GAP SERPL CALCULATED.3IONS-SCNC: 12 MMOL/L (ref 7–16)
AST SERPL-CCNC: 29 U/L (ref 0–39)
BASOPHILS ABSOLUTE: 0.04 E9/L (ref 0–0.2)
BASOPHILS RELATIVE PERCENT: 0.9 % (ref 0–2)
BILIRUB SERPL-MCNC: 0.8 MG/DL (ref 0–1.2)
BUN BLDV-MCNC: 10 MG/DL (ref 8–23)
CALCIUM SERPL-MCNC: 8.8 MG/DL (ref 8.6–10.2)
CHLORIDE BLD-SCNC: 100 MMOL/L (ref 98–107)
CO2: 25 MMOL/L (ref 22–29)
CREAT SERPL-MCNC: 0.7 MG/DL (ref 0.7–1.2)
EOSINOPHILS ABSOLUTE: 0.21 E9/L (ref 0.05–0.5)
EOSINOPHILS RELATIVE PERCENT: 4.5 % (ref 0–6)
GFR AFRICAN AMERICAN: >60
GFR NON-AFRICAN AMERICAN: >60 ML/MIN/1.73
GLUCOSE BLD-MCNC: 80 MG/DL (ref 74–109)
HCT VFR BLD CALC: 35 % (ref 37–54)
HEMOGLOBIN: 10.7 G/DL (ref 12.5–16.5)
IMMATURE GRANULOCYTES #: 0.05 E9/L
IMMATURE GRANULOCYTES %: 1.1 % (ref 0–5)
LYMPHOCYTES ABSOLUTE: 0.92 E9/L (ref 1.5–4)
LYMPHOCYTES RELATIVE PERCENT: 19.8 % (ref 20–42)
MCH RBC QN AUTO: 30.6 PG (ref 26–35)
MCHC RBC AUTO-ENTMCNC: 30.6 % (ref 32–34.5)
MCV RBC AUTO: 100 FL (ref 80–99.9)
MONOCYTES ABSOLUTE: 0.74 E9/L (ref 0.1–0.95)
MONOCYTES RELATIVE PERCENT: 15.9 % (ref 2–12)
NEUTROPHILS ABSOLUTE: 2.69 E9/L (ref 1.8–7.3)
NEUTROPHILS RELATIVE PERCENT: 57.8 % (ref 43–80)
PDW BLD-RTO: 16.9 FL (ref 11.5–15)
PLATELET # BLD: 84 E9/L (ref 130–450)
PLATELET CONFIRMATION: NORMAL
PMV BLD AUTO: 10.5 FL (ref 7–12)
POTASSIUM SERPL-SCNC: 3.8 MMOL/L (ref 3.5–5)
RBC # BLD: 3.5 E12/L (ref 3.8–5.8)
SODIUM BLD-SCNC: 137 MMOL/L (ref 132–146)
TOTAL PROTEIN: 7.2 G/DL (ref 6.4–8.3)
WBC # BLD: 4.7 E9/L (ref 4.5–11.5)

## 2018-09-24 PROCEDURE — 36415 COLL VENOUS BLD VENIPUNCTURE: CPT

## 2018-09-24 PROCEDURE — 85025 COMPLETE CBC W/AUTO DIFF WBC: CPT

## 2018-09-24 PROCEDURE — 80053 COMPREHEN METABOLIC PANEL: CPT

## 2018-09-27 ENCOUNTER — HOSPITAL ENCOUNTER (OUTPATIENT)
Age: 73
Discharge: HOME | End: 2018-09-29
Payer: COMMERCIAL

## 2018-09-27 ENCOUNTER — HOSPITAL ENCOUNTER (OUTPATIENT)
Dept: WOUND CARE | Age: 73
Discharge: HOME | End: 2018-09-27
Payer: MEDICARE

## 2018-09-27 VITALS
HEIGHT: 69 IN | DIASTOLIC BLOOD PRESSURE: 74 MMHG | SYSTOLIC BLOOD PRESSURE: 144 MMHG | RESPIRATION RATE: 18 BRPM | TEMPERATURE: 98.4 F | HEART RATE: 64 BPM | WEIGHT: 229 LBS | BODY MASS INDEX: 33.92 KG/M2

## 2018-09-27 LAB
ALBUMIN SERPL-MCNC: 2.9 G/DL (ref 3.5–5.2)
ALP BLD-CCNC: 78 U/L (ref 40–129)
ALT SERPL-CCNC: 13 U/L (ref 0–40)
ANION GAP SERPL CALCULATED.3IONS-SCNC: 12 MMOL/L (ref 7–16)
AST SERPL-CCNC: 31 U/L (ref 0–39)
BASOPHILS ABSOLUTE: 0.04 E9/L (ref 0–0.2)
BASOPHILS RELATIVE PERCENT: 0.8 % (ref 0–2)
BILIRUB SERPL-MCNC: 0.9 MG/DL (ref 0–1.2)
BUN BLDV-MCNC: 10 MG/DL (ref 8–23)
C-REACTIVE PROTEIN: 0.9 MG/DL (ref 0–0.4)
CALCIUM SERPL-MCNC: 9 MG/DL (ref 8.6–10.2)
CHLORIDE BLD-SCNC: 100 MMOL/L (ref 98–107)
CO2: 25 MMOL/L (ref 22–29)
CREAT SERPL-MCNC: 0.7 MG/DL (ref 0.7–1.2)
EOSINOPHILS ABSOLUTE: 0.33 E9/L (ref 0.05–0.5)
EOSINOPHILS RELATIVE PERCENT: 7 % (ref 0–6)
GFR AFRICAN AMERICAN: >60
GFR NON-AFRICAN AMERICAN: >60 ML/MIN/1.73
GLUCOSE BLD-MCNC: 93 MG/DL (ref 74–109)
HCT VFR BLD CALC: 34.5 % (ref 37–54)
HEMOGLOBIN: 10.8 G/DL (ref 12.5–16.5)
IMMATURE GRANULOCYTES #: 0 E9/L
IMMATURE GRANULOCYTES %: 0 % (ref 0–5)
LYMPHOCYTES ABSOLUTE: 1.07 E9/L (ref 1.5–4)
LYMPHOCYTES RELATIVE PERCENT: 22.6 % (ref 20–42)
MCH RBC QN AUTO: 30.9 PG (ref 26–35)
MCHC RBC AUTO-ENTMCNC: 31.3 % (ref 32–34.5)
MCV RBC AUTO: 98.6 FL (ref 80–99.9)
MONOCYTES ABSOLUTE: 0.79 E9/L (ref 0.1–0.95)
MONOCYTES RELATIVE PERCENT: 16.7 % (ref 2–12)
NEUTROPHILS ABSOLUTE: 2.51 E9/L (ref 1.8–7.3)
NEUTROPHILS RELATIVE PERCENT: 52.9 % (ref 43–80)
PDW BLD-RTO: 16.6 FL (ref 11.5–15)
PLATELET # BLD: 69 E9/L (ref 130–450)
PLATELET CONFIRMATION: NORMAL
PMV BLD AUTO: 11 FL (ref 7–12)
POTASSIUM SERPL-SCNC: 4.1 MMOL/L (ref 3.5–5)
RBC # BLD: 3.5 E12/L (ref 3.8–5.8)
SEDIMENTATION RATE, ERYTHROCYTE: 96 MM/HR (ref 0–15)
SODIUM BLD-SCNC: 137 MMOL/L (ref 132–146)
TOTAL PROTEIN: 7.8 G/DL (ref 6.4–8.3)
WBC # BLD: 4.7 E9/L (ref 4.5–11.5)

## 2018-09-27 PROCEDURE — 80053 COMPREHEN METABOLIC PANEL: CPT

## 2018-09-27 PROCEDURE — 86140 C-REACTIVE PROTEIN: CPT

## 2018-09-27 PROCEDURE — 11043 DBRDMT MUSC&/FSCA 1ST 20/<: CPT

## 2018-09-27 PROCEDURE — 85651 RBC SED RATE NONAUTOMATED: CPT

## 2018-09-27 PROCEDURE — 85025 COMPLETE CBC W/AUTO DIFF WBC: CPT

## 2018-09-27 PROCEDURE — 36415 COLL VENOUS BLD VENIPUNCTURE: CPT

## 2018-09-27 NOTE — PROGRESS NOTES
Wound Healing Center Followup Visit Note    Referring Physician : Kylee Mclaughlin MD  Vladimir Wallace  MEDICAL RECORD NUMBER:  [id number]  AGE: 68 y.o. GENDER: male  : 1945  EPISODE DATE:  2018    Subjective:     Chief Complaint   Patient presents with    Wound Check     roght foot      HISTORY of PRESENT ILLNESS HPI   Vladimir Wallace is a 68 y.o. male who presents today for wound/ulcer evaluation. Patient doing well. No pain. He does have his offloading shoe in place. Tolerating physical therapy. History of Wound Context:       Wound/Ulcer Pain Timing/Severity: none  Quality of pain: N/A  Severity:  0 / 10   Modifying Factors: None  Associated Signs/Symptoms: none    Ulcer Identification:  Ulcer Type: non-healing surgical  Contributing Factors: diabetes and neuropathy    Diabetic/Pressure/Non Pressure Ulcers only:  Ulcer: N/A    Wound: N/A        PAST MEDICAL HISTORY      Diagnosis Date    Arthritis     Cancer ([facility] [address]     COLON CA    Hypertension     Neuropathy      Past Surgical History:   Procedure Laterality Date    COLECTOMY      COLONOSCOPY      COLOSTOMY      EYE SURGERY      CATARACT OR    ND DEBRIDEMENT, SKIN, SUB-Q TISSUE,MUSCLE,BONE,=<20 SQ CM Right 2018    INCISION AND DRAINAGE ABSCESS RIGHT FOOT performed by Bryan Encarnacion DPM at [facility], SKIN, SUB-Q TISSUE,MUSCLE,BONE,=<20 SQ CM Right 2018    DEBRIDEMENT WOUND RIGHT FOOT performed by Bryan Encarnacion DPM at [facility]       History reviewed. No pertinent family history.   Social History   Substance Use Topics    Smoking status: Former Smoker    Smokeless tobacco: Never Used      Comment: quit in      Alcohol use 21.6 oz/week     36 Cans of beer per week     No Known Allergies  Current Outpatient Prescriptions on File Prior to Encounter   Medication Sig Dispense Refill    albuterol sulfate HFA (PROAIR HFA) 108 (90 Base) MCG/ACT inhaler Inhale 2 puffs into the DPM    Consent obtained:  Yes    Time out taken:  Yes    Pain Control: Anesthetic  Anesthetic: 2% Lidocaine Gel Topical     Debridement:Excisional Debridement    Using curette, # 10 blade scalpel and tissue nippers the wound(s)/ulcer(s) was/were sharply debrided down through and including the removal of muscle/fascia. Devitalized Tissue Debrided:  fibrin, biofilm, slough and necrotic/eschar to stimulate bleeding to promote healing, post debridement good bleeding base and wound edges noted    Pre Debridement Measurements:  Are located in the Wound/Ulcer Documentation Flow Sheet    Wound/Ulcer #: 1 and 2    Post Debridement Measurements:  Wound/Ulcer Descriptions are Pre Debridement except measurements:    Wound 09/08/18 Other (Comment) Foot Right (Active)   Dressing Status Clean;Dry; Intact 9/13/2018  9:40 AM   Dressing Changed Changed/New 9/9/2018  7:45 AM   Dressing/Treatment Dry dressing 9/9/2018  7:45 AM   Wound Cleansed Rinsed/Irrigated with saline 9/9/2018  7:45 AM   Dressing Change Due 09/10/18 9/9/2018  7:45 AM   Wound Length (cm) 0.5 cm 9/8/2018  3:00 PM   Wound Width (cm) 0.1 cm 9/8/2018  3:00 PM   Calculated Wound Size (cm^2) (l*w) 0.05 cm^2 9/8/2018  3:00 PM   Wound Assessment Edema; Red 9/8/2018  3:00 PM   Sabiha-wound Assessment Edema;Dry 9/8/2018  3:00 PM   Number of days: 18       Wound 09/20/18 Surgical dehiscence Foot Right;Dorsal #1 ACQ 9/8/18 (Active)   Wound Image   9/20/2018 11:34 AM   Wound Type Wound 9/20/2018 11:34 AM   Dressing Status Clean;Dry; Intact 9/20/2018 12:16 PM   Dressing Changed Changed/New 9/20/2018 12:16 PM   Dressing/Treatment Moist to moist 9/20/2018 12:16 PM   Wound Cleansed Rinsed/Irrigated with saline 9/20/2018 12:16 PM   Wound Length (cm) 6.2 cm 9/27/2018 11:18 AM   Wound Width (cm) 3.2 cm 9/27/2018 11:18 AM   Wound Depth (cm)  1.3 9/27/2018 11:18 AM   Calculated Wound Size (cm^2) (l*w) 19.84 cm^2 9/27/2018 11:18 AM   Change in Wound Size % (l*w) 8.91 9/27/2018 11:18 AM Wound Assessment Red;Yellow 9/27/2018 10:54 AM   Drainage Amount Moderate 9/27/2018 10:54 AM   Drainage Description Serosanguinous 9/27/2018 10:54 AM   Odor None 9/27/2018 10:54 AM   Exposed structure Tendon 9/27/2018 10:54 AM   Sabiha-wound Assessment Pink 9/27/2018 10:54 AM   Time out Yes 9/27/2018 11:18 AM   Number of days: 6       Wound 09/20/18 Surgical dehiscence Foot Right;Plantar #2 9/8/18 (Active)   Wound Image   9/20/2018 11:34 AM   Wound Type Wound 9/20/2018 11:34 AM   Wound Other 9/20/2018 11:34 AM   Dressing Changed Changed/New 9/20/2018 12:16 PM   Dressing/Treatment Moist to moist 9/20/2018 12:16 PM   Wound Cleansed Rinsed/Irrigated with saline 9/20/2018 12:16 PM   Wound Length (cm) 3.7 cm 9/27/2018 11:18 AM   Wound Width (cm) 1.2 cm 9/27/2018 11:18 AM   Wound Depth (cm)  1.2 9/27/2018 11:18 AM   Calculated Wound Size (cm^2) (l*w) 4.44 cm^2 9/27/2018 11:18 AM   Change in Wound Size % (l*w) -3.5 9/27/2018 11:18 AM   Undermining Starts ___ O'Clock 7 9/27/2018 10:54 AM   Undermining Ends___ O'Clock 12 9/27/2018 10:54 AM   Undermining Maxium Distance (cm) 1.5 9/27/2018 10:54 AM   Wound Assessment Red; White;Yellow 9/27/2018 10:54 AM   Drainage Amount Moderate 9/27/2018 10:54 AM   Drainage Description Serosanguinous; Serous 9/27/2018 10:54 AM   Odor None 9/27/2018 10:54 AM   Exposed structure Tendon 9/27/2018 10:54 AM   Sabiha-wound Assessment Maceration;Pink 9/27/2018 10:54 AM   Time out Yes 9/27/2018 11:18 AM   Number of days: 6       Wound 09/20/18 Other (Comment) Foot Left;Lateral #3 ACQ 9/18/18 (Active)   Wound Image   9/20/2018 11:34 AM   Wound Type Wound 9/20/2018 11:34 AM   Wound Pressure Stage  2 9/20/2018 11:34 AM   Dressing Status Clean;Dry; Intact 9/20/2018 12:16 PM   Dressing Changed Changed/New 9/20/2018 12:16 PM   Dressing/Treatment Moist to moist 9/20/2018 12:16 PM   Wound Cleansed Rinsed/Irrigated with saline 9/20/2018 12:16 PM   Wound Length (cm) 0.9 cm 9/27/2018 10:54 AM   Wound Width (cm) 0.9 cm 9/27/2018 10:54 AM   Wound Depth (cm)  0.1 9/27/2018 10:54 AM   Calculated Wound Size (cm^2) (l*w) 0.81 cm^2 9/27/2018 10:54 AM   Change in Wound Size % (l*w) 48.08 9/27/2018 10:54 AM   Wound Assessment Red;Yellow 9/27/2018 10:54 AM   Drainage Amount Small 9/27/2018 10:54 AM   Drainage Description Serosanguinous; Yellow 9/27/2018 10:54 AM   Odor None 9/27/2018 10:54 AM   Sabiha-wound Assessment Intact 9/27/2018 10:54 AM   Debridement per physician None 9/27/2018 11:18 AM   Time out Yes 9/20/2018 11:54 AM   Number of days: 6       Incision 09/09/18 Foot Right (Active)   Wound Assessment SUBHA 9/12/2018  8:40 PM   Sabiha-wound Assessment SUBHA 9/12/2018  8:40 PM   Closure SUBHA 9/12/2018  8:40 PM   Drainage Amount None 9/12/2018  8:40 PM   Dressing/Treatment Dry dressing; Ace Wrap 9/12/2018  8:40 PM   Dressing Changed Changed/New 9/9/2018  1:36 PM   Dressing Status Clean;Dry; Intact 9/13/2018  9:40 AM   Number of days: 17       Incision 09/12/18 Foot Right (Active)   Wound Assessment SUBHA 9/12/2018  8:40 PM   Sabiha-wound Assessment SUBHA 9/12/2018  8:40 PM   Closure SUBHA 9/12/2018  8:40 PM   Drainage Amount None 9/12/2018  8:40 PM   Dressing/Treatment Dry dressing; Ace Wrap 9/12/2018  8:40 PM   Dressing Status Clean;Dry; Intact 9/13/2018  9:40 AM   Number of days: 15     Percent of Wound/Ulcer Debrided: 30%    Total Surface Area Debrided:  8 sq cm     Estimated Blood Loss:  Minimal  Hemostasis Achieved:  by pressure    Procedural Pain:  0  / 10   Post Procedural Pain:  0 / 10     Response to treatment:  Well tolerated by patient. Plan:   Treatment Note please see attached Discharge Instructions. Continue with offloading shoe as instructed. Questions or concerns contact wound care center or myself otherwise follow-up as instructed    Written patient dismissal instructions given to patient and signed by patient or POA.          Discharge Instructions       Visit Discharge/Physician Orders     Discharge condition:

## 2018-10-01 ENCOUNTER — HOSPITAL ENCOUNTER (OUTPATIENT)
Age: 73
Discharge: HOME | End: 2018-10-03
Payer: COMMERCIAL

## 2018-10-01 LAB
ALBUMIN SERPL-MCNC: 2.7 G/DL (ref 3.5–5.2)
ALP BLD-CCNC: 78 U/L (ref 40–129)
ALT SERPL-CCNC: 11 U/L (ref 0–40)
ANION GAP SERPL CALCULATED.3IONS-SCNC: 11 MMOL/L (ref 7–16)
AST SERPL-CCNC: 25 U/L (ref 0–39)
BASOPHILS ABSOLUTE: 0.02 E9/L (ref 0–0.2)
BASOPHILS RELATIVE PERCENT: 0.5 % (ref 0–2)
BILIRUB SERPL-MCNC: 0.7 MG/DL (ref 0–1.2)
BUN BLDV-MCNC: 12 MG/DL (ref 8–23)
CALCIUM SERPL-MCNC: 8.9 MG/DL (ref 8.6–10.2)
CHLORIDE BLD-SCNC: 101 MMOL/L (ref 98–107)
CO2: 26 MMOL/L (ref 22–29)
CREAT SERPL-MCNC: 0.8 MG/DL (ref 0.7–1.2)
EOSINOPHILS ABSOLUTE: 0.58 E9/L (ref 0.05–0.5)
EOSINOPHILS RELATIVE PERCENT: 13.3 % (ref 0–6)
GFR AFRICAN AMERICAN: >60
GFR NON-AFRICAN AMERICAN: >60 ML/MIN/1.73
GLUCOSE BLD-MCNC: 94 MG/DL (ref 74–109)
HCT VFR BLD CALC: 32 % (ref 37–54)
HEMOGLOBIN: 10.3 G/DL (ref 12.5–16.5)
IMMATURE GRANULOCYTES #: 0.01 E9/L
IMMATURE GRANULOCYTES %: 0.2 % (ref 0–5)
LYMPHOCYTES ABSOLUTE: 0.82 E9/L (ref 1.5–4)
LYMPHOCYTES RELATIVE PERCENT: 18.9 % (ref 20–42)
MCH RBC QN AUTO: 31.7 PG (ref 26–35)
MCHC RBC AUTO-ENTMCNC: 32.2 % (ref 32–34.5)
MCV RBC AUTO: 98.5 FL (ref 80–99.9)
MONOCYTES ABSOLUTE: 0.78 E9/L (ref 0.1–0.95)
MONOCYTES RELATIVE PERCENT: 17.9 % (ref 2–12)
NEUTROPHILS ABSOLUTE: 2.14 E9/L (ref 1.8–7.3)
NEUTROPHILS RELATIVE PERCENT: 49.2 % (ref 43–80)
PDW BLD-RTO: 16.3 FL (ref 11.5–15)
PLATELET # BLD: 63 E9/L (ref 130–450)
PLATELET CONFIRMATION: NORMAL
PMV BLD AUTO: 11.7 FL (ref 7–12)
POTASSIUM SERPL-SCNC: 4.1 MMOL/L (ref 3.5–5)
RBC # BLD: 3.25 E12/L (ref 3.8–5.8)
SODIUM BLD-SCNC: 138 MMOL/L (ref 132–146)
TOTAL PROTEIN: 7.1 G/DL (ref 6.4–8.3)
WBC # BLD: 4.4 E9/L (ref 4.5–11.5)

## 2018-10-01 PROCEDURE — 85025 COMPLETE CBC W/AUTO DIFF WBC: CPT

## 2018-10-01 PROCEDURE — 36415 COLL VENOUS BLD VENIPUNCTURE: CPT

## 2018-10-01 PROCEDURE — 80053 COMPREHEN METABOLIC PANEL: CPT

## 2018-10-04 ENCOUNTER — HOSPITAL ENCOUNTER (OUTPATIENT)
Dept: WOUND CARE | Age: 73
Discharge: HOME | End: 2018-10-04
Payer: COMMERCIAL

## 2018-10-04 ENCOUNTER — HOSPITAL ENCOUNTER (OUTPATIENT)
Age: 73
Discharge: HOME | End: 2018-10-06
Payer: MEDICARE

## 2018-10-04 VITALS
TEMPERATURE: 98.3 F | RESPIRATION RATE: 18 BRPM | SYSTOLIC BLOOD PRESSURE: 110 MMHG | HEART RATE: 70 BPM | DIASTOLIC BLOOD PRESSURE: 50 MMHG

## 2018-10-04 LAB
ALBUMIN SERPL-MCNC: 2.8 G/DL (ref 3.5–5.2)
ALP BLD-CCNC: 73 U/L (ref 40–129)
ALT SERPL-CCNC: 11 U/L (ref 0–40)
ANION GAP SERPL CALCULATED.3IONS-SCNC: 10 MMOL/L (ref 7–16)
AST SERPL-CCNC: 27 U/L (ref 0–39)
BASOPHILS ABSOLUTE: 0.02 E9/L (ref 0–0.2)
BASOPHILS RELATIVE PERCENT: 0.5 % (ref 0–2)
BILIRUB SERPL-MCNC: 0.5 MG/DL (ref 0–1.2)
BUN BLDV-MCNC: 10 MG/DL (ref 8–23)
C-REACTIVE PROTEIN: 1 MG/DL (ref 0–0.4)
CALCIUM SERPL-MCNC: 8.9 MG/DL (ref 8.6–10.2)
CHLORIDE BLD-SCNC: 103 MMOL/L (ref 98–107)
CO2: 26 MMOL/L (ref 22–29)
CREAT SERPL-MCNC: 0.8 MG/DL (ref 0.7–1.2)
EOSINOPHILS ABSOLUTE: 0.52 E9/L (ref 0.05–0.5)
EOSINOPHILS RELATIVE PERCENT: 12.9 % (ref 0–6)
GFR AFRICAN AMERICAN: >60
GFR NON-AFRICAN AMERICAN: >60 ML/MIN/1.73
GLUCOSE BLD-MCNC: 88 MG/DL (ref 74–109)
HCT VFR BLD CALC: 33.9 % (ref 37–54)
HEMOGLOBIN: 10.6 G/DL (ref 12.5–16.5)
IMMATURE GRANULOCYTES #: 0.02 E9/L
IMMATURE GRANULOCYTES %: 0.5 % (ref 0–5)
LYMPHOCYTES ABSOLUTE: 0.84 E9/L (ref 1.5–4)
LYMPHOCYTES RELATIVE PERCENT: 20.9 % (ref 20–42)
MCH RBC QN AUTO: 31.3 PG (ref 26–35)
MCHC RBC AUTO-ENTMCNC: 31.3 % (ref 32–34.5)
MCV RBC AUTO: 100 FL (ref 80–99.9)
MONOCYTES ABSOLUTE: 0.74 E9/L (ref 0.1–0.95)
MONOCYTES RELATIVE PERCENT: 18.4 % (ref 2–12)
NEUTROPHILS ABSOLUTE: 1.88 E9/L (ref 1.8–7.3)
NEUTROPHILS RELATIVE PERCENT: 46.8 % (ref 43–80)
PDW BLD-RTO: 16.2 FL (ref 11.5–15)
PLATELET # BLD: 70 E9/L (ref 130–450)
PLATELET CONFIRMATION: NORMAL
PMV BLD AUTO: 11.4 FL (ref 7–12)
POTASSIUM SERPL-SCNC: 4.1 MMOL/L (ref 3.5–5)
RBC # BLD: 3.39 E12/L (ref 3.8–5.8)
SEDIMENTATION RATE, ERYTHROCYTE: 83 MM/HR (ref 0–15)
SODIUM BLD-SCNC: 139 MMOL/L (ref 132–146)
TOTAL PROTEIN: 7.2 G/DL (ref 6.4–8.3)
WBC # BLD: 4 E9/L (ref 4.5–11.5)

## 2018-10-04 PROCEDURE — 86140 C-REACTIVE PROTEIN: CPT

## 2018-10-04 PROCEDURE — 11043 DBRDMT MUSC&/FSCA 1ST 20/<: CPT

## 2018-10-04 PROCEDURE — 36415 COLL VENOUS BLD VENIPUNCTURE: CPT

## 2018-10-04 PROCEDURE — 85025 COMPLETE CBC W/AUTO DIFF WBC: CPT

## 2018-10-04 PROCEDURE — 85651 RBC SED RATE NONAUTOMATED: CPT

## 2018-10-04 PROCEDURE — 80053 COMPREHEN METABOLIC PANEL: CPT

## 2018-10-04 ASSESSMENT — PAIN SCALES - GENERAL: PAINLEVEL_OUTOF10: 0

## 2018-10-04 NOTE — PLAN OF CARE
Problem: Wound:  Goal: Will show signs of wound healing; wound closure and no evidence of infection  Will show signs of wound healing; wound closure and no evidence of infection   Outcome: Ongoing      Problem: Blood Glucose:  Goal: Ability to maintain appropriate glucose levels will improve  Ability to maintain appropriate glucose levels will improve   Outcome: Ongoing      Problem: Pain:  Goal: Pain level will decrease  Pain level will decrease   Outcome: Ongoing

## 2018-10-04 NOTE — PROGRESS NOTES
Wheezing 1 Inhaler 3    cetirizine (ZYRTEC) 10 MG tablet Take 1 tablet by mouth daily      losartan (COZAAR) 50 MG tablet Take 1 tablet by mouth daily 30 tablet 3    fluticasone (FLONASE) 50 MCG/ACT nasal spray 1 spray by Each Nare route daily 1 Bottle 3    vitamin B-12 (CYANOCOBALAMIN) 100 MCG tablet Take 100 mcg by mouth daily      Turmeric 400 MG CAPS Take 400 mg by mouth      vitamin B-6 (PYRIDOXINE) 100 MG tablet Take 100 mg by mouth daily      Multiple Vitamins-Minerals (VISION-JERED PRESERVE PO) Take 1 tablet by mouth      Multiple Vitamin (MULTI-VITAMIN DAILY PO) Take by mouth      metoprolol succinate (TOPROL XL) 50 MG extended release tablet Take 50 mg by mouth daily       No current facility-administered medications on file prior to encounter. REVIEW OF SYSTEMS See HPI    Objective:    BP (!) 110/50   Pulse 70   Temp 98.3 °F (36.8 °C) (Oral)   Resp 18   Wt Readings from Last 3 Encounters:   09/27/18 229 lb (103.9 kg)   09/20/18 229 lb (103.9 kg)   09/13/18 229 lb (103.9 kg)     PHYSICAL EXAM  CONSTITUTIONAL:   Awake, alert, cooperative   EYES:  lids and lashes normal   ENT: external ears and nose without lesions   NECK:  supple, symmetrical, trachea midline   SKIN:  Open wound dorsal and plantar aspect continue to improve. Small amount both dorsal and plantar with devitalized nonviable muscle and tendon which is visible. Approximately 20%. Remaining wound is beefy. No purulence or odor. VSI  Loss of protective sensation    Assessment:     Non-Healing surgical  Procedure Note  Indications:  Based on my examination of this patient's wound(s)/ulcer(s) today, debridement is required to promote healing and evaluate the wound base.     Performed by: Carlin Barnes DPM    Consent obtained:  Yes    Time out taken:  Yes    Pain Control:       Debridement:Excisional Debridement    Using curette, # 10 blade scalpel and tissue nippers the wound(s)/ulcer(s) was/were sharply debrided down through and Yes 10/4/2018 11:40 AM   Number of days: 14       Wound 09/20/18 Surgical dehiscence Foot Right;Plantar #2 9/8/18 (Active)   Wound Image   9/20/2018 11:34 AM   Wound Type Wound 9/20/2018 11:34 AM   Wound Other 9/20/2018 11:34 AM   Dressing Status Clean;Dry; Intact 9/27/2018 11:40 AM   Dressing Changed Changed/New 9/27/2018 11:40 AM   Dressing/Treatment Moist to moist 9/27/2018 11:40 AM   Wound Cleansed Rinsed/Irrigated with saline 9/27/2018 11:40 AM   Wound Length (cm) 2.5 cm 10/4/2018 11:40 AM   Wound Width (cm) 0.9 cm 10/4/2018 11:40 AM   Wound Depth (cm)  0.6 10/4/2018 11:40 AM   Calculated Wound Size (cm^2) (l*w) 2.25 cm^2 10/4/2018 11:40 AM   Change in Wound Size % (l*w) 47.55 10/4/2018 11:40 AM   Undermining Starts ___ O'Clock 3 10/4/2018 11:19 AM   Undermining Ends___ O'Clock 6 10/4/2018 11:19 AM   Undermining Maxium Distance (cm) 0.6 @ 6 10/4/2018 11:19 AM   Wound Assessment Red 10/4/2018 11:19 AM   Drainage Amount Moderate 10/4/2018 11:19 AM   Drainage Description Serosanguinous; Serous 10/4/2018 11:19 AM   Odor None 10/4/2018 11:19 AM   Exposed structure Tendon 9/27/2018 10:54 AM   Sabiha-wound Assessment Maceration; White; Tan 10/4/2018 11:19 AM   Time out Yes 10/4/2018 11:40 AM   Number of days: 14       Wound 09/20/18 Other (Comment) Foot Left;Lateral #3 ACQ 9/18/18 (Active)   Wound Image   9/20/2018 11:34 AM   Wound Type Wound 9/20/2018 11:34 AM   Wound Pressure Stage  2 9/20/2018 11:34 AM   Dressing Status Clean;Dry; Intact 9/27/2018 11:40 AM   Dressing Changed Changed/New 9/27/2018 11:40 AM   Dressing/Treatment Alginate;Dry dressing 9/27/2018 11:40 AM   Wound Cleansed Rinsed/Irrigated with saline 9/27/2018 11:40 AM   Wound Length (cm) 0.9 cm 9/27/2018 10:54 AM   Wound Width (cm) 0.9 cm 9/27/2018 10:54 AM   Wound Depth (cm)  0.1 9/27/2018 10:54 AM   Calculated Wound Size (cm^2) (l*w) 0.81 cm^2 9/27/2018 10:54 AM   Change in Wound Size % (l*w) 48.08 9/27/2018 10:54 AM   Wound Assessment Red;Yellow 9/27/2018 TIMES DAILY. LEFT LATERAL FOOT- OPEN TO AIR     Treatment Orders:FOLLOW A NUTRITIOUS DIET HIGH IN PROTEIN AND VITAMIN C TO PROMOTE WOUND HEALING. TAKE A MULTIVITAMIN DAILY IF OK WITH PCP     CONTACT  FOR OFFLOADING SHOE TO RIGHT FOOT. CALL DR Marvel Boone WHEN OBTAINED.      PATIENT FOLLOWS WITH INFECTIOUS DISEASE / IV ANTIBIOTICS AS ORDERED      NONE  WEIGHTBEARING TO RIGHT FOOT.           [facility] followup visit ___1 WEEK__________________________  (Please note your next appointment above and if you are unable to keep, kindly give a 24 hour notice. Thank you.)     Physician signature:__________________________        If you experience any of the following, please call the [facility] during business hours:     * Increase in Pain  * Temperature over 101  * Increase in drainage from your wound  * Drainage with a foul odor  * Bleeding  * Increase in swelling  * Need for compression bandage changes due to slippage, breakthrough drainage.     If you need medical attention outside of the business hours of the [facility] please contact your PCP or go to the nearest emergency room.         Electronically signed by Luke Avalos DPM on 10/4/2018 at 11:47 AM

## 2018-10-08 ENCOUNTER — HOSPITAL ENCOUNTER (OUTPATIENT)
Age: 73
Discharge: HOME | End: 2018-10-10
Payer: MEDICARE

## 2018-10-11 ENCOUNTER — HOSPITAL ENCOUNTER (OUTPATIENT)
Dept: WOUND CARE | Age: 73
Discharge: HOME | End: 2018-10-11
Payer: MEDICARE

## 2018-10-11 VITALS
TEMPERATURE: 98.8 F | WEIGHT: 229 LBS | SYSTOLIC BLOOD PRESSURE: 130 MMHG | HEIGHT: 69 IN | HEART RATE: 76 BPM | DIASTOLIC BLOOD PRESSURE: 60 MMHG | RESPIRATION RATE: 16 BRPM | BODY MASS INDEX: 33.92 KG/M2

## 2018-10-11 PROCEDURE — 11042 DBRDMT SUBQ TIS 1ST 20SQCM/<: CPT

## 2018-10-11 RX ORDER — AMOXICILLIN AND CLAVULANATE POTASSIUM 500; 125 MG/1; MG/1
1 TABLET, FILM COATED ORAL 2 TIMES DAILY
COMMUNITY
End: 2018-11-01 | Stop reason: ALTCHOICE

## 2018-10-13 PROBLEM — R05.9 COUGH: Status: RESOLVED | Noted: 2018-09-13 | Resolved: 2018-10-13

## 2018-10-15 LAB
FUNGUS (MYCOLOGY) CULTURE: NORMAL
FUNGUS (MYCOLOGY) CULTURE: NORMAL
FUNGUS STAIN: NORMAL
FUNGUS STAIN: NORMAL

## 2018-10-18 ENCOUNTER — HOSPITAL ENCOUNTER (OUTPATIENT)
Dept: WOUND CARE | Age: 73
Discharge: HOME | End: 2018-10-18
Payer: MEDICARE

## 2018-10-18 VITALS
BODY MASS INDEX: 31.84 KG/M2 | TEMPERATURE: 98.1 F | SYSTOLIC BLOOD PRESSURE: 130 MMHG | WEIGHT: 215 LBS | RESPIRATION RATE: 16 BRPM | HEIGHT: 69 IN | HEART RATE: 80 BPM | DIASTOLIC BLOOD PRESSURE: 58 MMHG

## 2018-10-18 PROCEDURE — 11042 DBRDMT SUBQ TIS 1ST 20SQCM/<: CPT

## 2018-10-18 NOTE — PROGRESS NOTES
evident. Vascular intact with audible dorsalis pedis and posterior tibial pulses bilateral. Lower extremity edema, 2+ pitting type  Loss of protective sensation    Assessment:     Wounds  Procedure Note  Indications:  Based on my examination of this patient's wound(s)/ulcer(s) today, debridement is required to promote healing and evaluate the wound base. Performed by: Seng Jeffrey DPM    Consent obtained:  Yes    Time out taken:  Yes    Pain Control: Anesthetic  Anesthetic: 2% Lidocaine Gel Topical     Debridement:Excisional Debridement    Using curette the wound(s)/ulcer(s) was/were sharply debrided down through and including the removal of subcutaneous tissue. Devitalized Tissue Debrided:  fibrin, biofilm and slough to stimulate bleeding to promote healing, post debridement good bleeding base and wound edges noted    Pre Debridement Measurements:  Are located in the [place]  Documentation Flow Sheet    Wound/Ulcer #: 1 and 2    Post Debridement Measurements:  Wound/Ulcer Descriptions are Pre Debridement except measurements:    Wound 09/08/18 Other (Comment) Foot Right (Active)   Number of days: 39       Wound 09/20/18 Surgical dehiscence Foot Right;Dorsal #1 ACQ 9/8/18 (Active)   Wound Image   10/18/2018 11:47 AM   Wound Type Wound 9/20/2018 11:34 AM   Dressing Status Clean;Dry; Intact 10/11/2018 11:41 AM   Dressing Changed Changed/New 10/11/2018 11:41 AM   Dressing/Treatment Alginate;Dry dressing 10/11/2018 11:41 AM   Wound Cleansed Rinsed/Irrigated with saline 10/11/2018 11:41 AM   Wound Length (cm) 5 cm 10/18/2018 11:57 AM   Wound Width (cm) 2.8 cm 10/18/2018 11:57 AM   Wound Depth (cm)  0.5 10/18/2018 11:57 AM   Calculated Wound Size (cm^2) (l*w) 14 cm^2 10/18/2018 11:57 AM   Change in Wound Size % (l*w) 35.72 10/18/2018 11:57 AM   Wound Assessment Red;Yellow 10/18/2018 11:47 AM   Drainage Amount Moderate 10/18/2018 11:47 AM   Drainage Description Serosanguinous 10/18/2018 11:47 AM   Odor None

## 2018-10-25 ENCOUNTER — HOSPITAL ENCOUNTER (OUTPATIENT)
Dept: WOUND CARE | Age: 73
Discharge: HOME | End: 2018-10-25
Payer: MEDICARE

## 2018-10-25 VITALS
BODY MASS INDEX: 31.84 KG/M2 | SYSTOLIC BLOOD PRESSURE: 122 MMHG | HEIGHT: 69 IN | DIASTOLIC BLOOD PRESSURE: 58 MMHG | WEIGHT: 215 LBS | HEART RATE: 80 BPM | RESPIRATION RATE: 16 BRPM | TEMPERATURE: 98.3 F

## 2018-10-25 PROCEDURE — 11042 DBRDMT SUBQ TIS 1ST 20SQCM/<: CPT

## 2018-10-25 NOTE — PROGRESS NOTES
Wound Healing Center Followup Visit Note    Referring Physician : Oswaldo Cranker, MD  Landon Sanchez  MEDICAL RECORD NUMBER:  [id number]  AGE: 68 y.o. GENDER: male  : 1945  EPISODE DATE:  10/25/2018    Subjective:     Chief Complaint   Patient presents with    Wound Check     RT FOOT      HISTORY of PRESENT ILLNESS HPI   Landon Sanchez is a 68 y.o. male who presents today with his daughterfor wound/ulcer evaluation. Dressing changes as instructed. He noticed a new area on the top of his left 2nd toe, superficial scabs. It was rubbing on his shoe. He does present with open toed shoe. No pain. History of Wound Context:       Wound/Ulcer Pain Timing/Severity: none  Quality of pain: N/A  Severity:  0 / 10   Modifying Factors: None  Associated Signs/Symptoms: none    Ulcer Identification:  Ulcer Type: venous  Contributing Factors: venous stasis    Diabetic/Pressure/Non Pressure Ulcers only:  Ulcer: N/A    Wound: N/A        PAST MEDICAL HISTORY      Diagnosis Date    Arthritis     Cancer ([facility] [address]     COLON CA    Hypertension     Neuropathy      Past Surgical History:   Procedure Laterality Date    COLECTOMY      COLONOSCOPY  2016    COLOSTOMY  2005    EYE SURGERY      CATARACT OR    [place] DEBRIDEMENT, SKIN, SUB-Q TISSUE,MUSCLE,BONE,=<20 SQ CM Right 2018    INCISION AND DRAINAGE ABSCESS RIGHT FOOT performed by Kenton Parikh DPM at [address], SKIN, SUB-Q TISSUE,MUSCLE,BONE,=<20 SQ CM Right 2018    DEBRIDEMENT WOUND RIGHT FOOT performed by Kenton Parikh DPM at [address]       No family history on file.   Social History   Substance Use Topics    Smoking status: Former Smoker    Smokeless tobacco: Never Used      Comment: quit in      Alcohol use 21.6 oz/week     36 Cans of beer per week     No Known Allergies  Current Outpatient Prescriptions on File Prior to Encounter   Medication Sig Dispense Refill    amoxicillin-clavulanate (AUGMENTIN) sensation    Assessment:     Right foot wounds. Stasis ulcer right lower extremity. Procedure Note  Indications:  Based on my examination of this patient's wound(s)/ulcer(s) today, debridement is required to promote healing and evaluate the wound base. Performed by: Joselito Colindres DPM    Consent obtained:  Yes    Time out taken:  Yes    Pain Control: Anesthetic  Anesthetic: 2% Lidocaine Gel Topical     Debridement:Excisional Debridement    Using curette and # 10 blade scalpel the wound(s)/ulcer(s) was/were sharply debrided down through and including the removal of subcutaneous tissue. Devitalized Tissue Debrided:  fibrin, biofilm and slough to stimulate bleeding to promote healing, post debridement good bleeding base and wound edges noted    Pre Debridement Measurements:  Are located in the Wound/Ulcer Documentation Flow Sheet    Wound/Ulcer #: 1, 2 and 4    Post Debridement Measurements:  Wound/Ulcer Descriptions are Pre Debridement except measurements:    Wound 09/08/18 Other (Comment) Foot Right (Active)   Number of days: 46       Wound 09/20/18 Surgical dehiscence Foot Right;Dorsal #1 ACQ 9/8/18 (Active)   Wound Image   10/18/2018 11:47 AM   Wound Type Wound 9/20/2018 11:34 AM   Dressing Status Clean;Dry; Intact 10/18/2018 12:12 PM   Dressing Changed Changed/New 10/18/2018 12:12 PM   Dressing/Treatment Silver dressing;Alginate;Dry dressing 10/18/2018 12:12 PM   Wound Cleansed Rinsed/Irrigated with saline 10/18/2018 12:12 PM   Wound Length (cm) 4.5 cm 10/25/2018 11:22 AM   Wound Width (cm) 2.5 cm 10/25/2018 11:22 AM   Wound Depth (cm)  0.5 10/25/2018 11:22 AM   Calculated Wound Size (cm^2) (l*w) 11.25 cm^2 10/25/2018 11:22 AM   Change in Wound Size % (l*w) 48.35 10/25/2018 11:22 AM   Wound Assessment Pink;Red;Yellow 10/25/2018 11:01 AM   Drainage Amount Moderate 10/25/2018 11:01 AM   Drainage Description Serosanguinous 10/25/2018 11:01 AM   Odor None 10/25/2018 11:01 AM   Exposed structure Tendon

## 2018-10-30 LAB
AFB CULTURE (MYCOBACTERIA): NORMAL
AFB CULTURE (MYCOBACTERIA): NORMAL
AFB SMEAR: NORMAL
AFB SMEAR: NORMAL

## 2018-11-01 ENCOUNTER — HOSPITAL ENCOUNTER (OUTPATIENT)
Dept: WOUND CARE | Age: 73
Discharge: HOME | End: 2018-11-01
Payer: MEDICARE

## 2018-11-01 VITALS
RESPIRATION RATE: 18 BRPM | WEIGHT: 215 LBS | DIASTOLIC BLOOD PRESSURE: 60 MMHG | TEMPERATURE: 98.2 F | BODY MASS INDEX: 31.75 KG/M2 | HEART RATE: 68 BPM | SYSTOLIC BLOOD PRESSURE: 124 MMHG

## 2018-11-01 PROCEDURE — 11042 DBRDMT SUBQ TIS 1ST 20SQCM/<: CPT

## 2018-11-01 NOTE — PROGRESS NOTES
the wound(s)/ulcer(s) was/were sharply debrided down through and including the removal of subcutaneous tissue. Devitalized Tissue Debrided:  fibrin, biofilm and slough to stimulate bleeding to promote healing, post debridement good bleeding base and wound edges noted    Pre Debridement Measurements:  Are located in the [place]  Documentation Flow Sheet    Wound/Ulcer #: 1 and 2    Post Debridement Measurements:  Wound/Ulcer Descriptions are Pre Debridement except measurements:    Wound 09/08/18 Other (Comment) Foot Right (Active)   Number of days: 53       Wound 09/20/18 Surgical dehiscence Foot Right;Dorsal #1 ACQ 9/8/18 (Active)   Wound Image   10/18/2018 11:47 AM   Wound Type Wound 9/20/2018 11:34 AM   Dressing Status Clean;Dry; Intact 10/25/2018 11:51 AM   Dressing Changed Changed/New 10/25/2018 11:51 AM   Dressing/Treatment Alginate;Dry dressing 10/25/2018 11:51 AM   Wound Cleansed Rinsed/Irrigated with saline 10/25/2018 11:51 AM   Wound Length (cm) 3.2 cm 11/1/2018 11:11 AM   Wound Width (cm) 2.2 cm 11/1/2018 11:11 AM   Wound Depth (cm)  0.4 11/1/2018 11:11 AM   Calculated Wound Size (cm^2) (l*w) 7.04 cm^2 11/1/2018 11:11 AM   Change in Wound Size % (l*w) 67.68 11/1/2018 11:11 AM   Wound Assessment Pink;Red;Yellow 11/1/2018 11:02 AM   Drainage Amount Moderate 11/1/2018 11:02 AM   Drainage Description Serosanguinous 11/1/2018 11:02 AM   Odor None 11/1/2018 11:02 AM   Exposed structure Tendon 10/18/2018 11:47 AM   Sabiha-wound Assessment Intact 11/1/2018 11:02 AM   Time out Yes 11/1/2018 11:11 AM   Number of days: 41       Wound 09/20/18 Surgical dehiscence Foot Right;Plantar #2 9/8/18 (Active)   Wound Image   10/18/2018 11:47 AM   Wound Type Wound 9/20/2018 11:34 AM   Wound Other 9/20/2018 11:34 AM   Dressing Status Clean;Dry; Intact 10/25/2018 11:51 AM   Dressing Changed Changed/New 10/25/2018 11:51 AM   Dressing/Treatment Alginate;Dry dressing 10/25/2018 11:51 AM   Wound Cleansed Rinsed/Irrigated with

## 2018-11-08 ENCOUNTER — HOSPITAL ENCOUNTER (OUTPATIENT)
Dept: WOUND CARE | Age: 73
Discharge: HOME | End: 2018-11-08
Payer: MEDICARE

## 2018-11-08 VITALS
DIASTOLIC BLOOD PRESSURE: 64 MMHG | BODY MASS INDEX: 30.81 KG/M2 | WEIGHT: 208 LBS | HEART RATE: 72 BPM | RESPIRATION RATE: 18 BRPM | TEMPERATURE: 98.4 F | SYSTOLIC BLOOD PRESSURE: 132 MMHG | HEIGHT: 69 IN

## 2018-11-08 PROCEDURE — 11042 DBRDMT SUBQ TIS 1ST 20SQCM/<: CPT

## 2018-11-08 NOTE — PROGRESS NOTES
Inhaler 3    cetirizine (ZYRTEC) 10 MG tablet Take 1 tablet by mouth daily      fluticasone (FLONASE) 50 MCG/ACT nasal spray 1 spray by Each Nare route daily (Patient taking differently: 1 spray by Each Nare route daily as needed ) 1 Bottle 3    vitamin B-12 (CYANOCOBALAMIN) 100 MCG tablet Take 100 mcg by mouth daily      Turmeric 400 MG CAPS Take 400 mg by mouth      vitamin B-6 (PYRIDOXINE) 100 MG tablet Take 100 mg by mouth daily      Multiple Vitamin (MULTI-VITAMIN DAILY PO) Take by mouth      metoprolol succinate (TOPROL XL) 50 MG extended release tablet Take 50 mg by mouth daily      Multiple Vitamins-Minerals (VISION-JERED PRESERVE PO) Take 1 tablet by mouth       No current facility-administered medications on file prior to encounter. REVIEW OF SYSTEMS See HPI    Objective:    /64   Pulse 72   Temp 98.4 °F (36.9 °C) (Oral)   Resp 18   Ht 5' 9\" (1.753 m)   Wt 208 lb (94.3 kg)   BMI 30.72 kg/m²   Wt Readings from Last 3 Encounters:   11/08/18 208 lb (94.3 kg)   11/01/18 215 lb (97.5 kg)   10/25/18 215 lb (97.5 kg)     PHYSICAL EXAM  CONSTITUTIONAL:   Awake, alert, cooperative   EYES:  lids and lashes normal   ENT: external ears and nose without lesions   NECK:  supple, symmetrical, trachea midline   SKIN:  Medial lower extremity wound resolved. Plantar wound covered with devitalized nonviable tissue, with debridement continues to improve. Dorsal wound less than 10% devitalized nonviable tissue. No purulence or odor. Decreased and hyper granular tissue. Vascular intact    Assessment:     Wound  Procedure Note  Indications:  Based on my examination of this patient's wound(s)/ulcer(s) today, debridement is required to promote healing and evaluate the wound base.     Performed by: Joselito Colindres DPM    Consent obtained:  Yes    Time out taken:  Yes    Pain Control: Anesthetic  Anesthetic: 2% Lidocaine Gel Topical     Debridement:Excisional Debridement    Using curette the

## 2018-11-15 ENCOUNTER — HOSPITAL ENCOUNTER (OUTPATIENT)
Dept: ULTRASOUND IMAGING | Age: 73
Discharge: HOME | End: 2018-11-17
Payer: MEDICARE

## 2018-11-15 ENCOUNTER — HOSPITAL ENCOUNTER (OUTPATIENT)
Dept: WOUND CARE | Age: 73
Discharge: HOME | End: 2018-11-15
Payer: MEDICARE

## 2018-11-15 VITALS
DIASTOLIC BLOOD PRESSURE: 72 MMHG | TEMPERATURE: 98.9 F | HEIGHT: 69 IN | BODY MASS INDEX: 31.84 KG/M2 | RESPIRATION RATE: 18 BRPM | WEIGHT: 215 LBS | SYSTOLIC BLOOD PRESSURE: 128 MMHG | HEART RATE: 84 BPM

## 2018-11-15 DIAGNOSIS — M79.662 PAIN IN LEFT LOWER LEG: ICD-10-CM

## 2018-11-15 DIAGNOSIS — L03.116 CELLULITIS OF LEFT LOWER EXTREMITY: ICD-10-CM

## 2018-11-15 PROCEDURE — 93971 EXTREMITY STUDY: CPT

## 2018-11-15 PROCEDURE — 11042 DBRDMT SUBQ TIS 1ST 20SQCM/<: CPT

## 2018-11-15 RX ORDER — DOXYCYCLINE HYCLATE 100 MG
100 TABLET ORAL 2 TIMES DAILY
Qty: 20 TABLET | Refills: 0 | Status: SHIPPED | OUTPATIENT
Start: 2018-11-15 | End: 2018-11-25

## 2018-11-15 NOTE — PLAN OF CARE
Problem: Wound:  Goal: Will show signs of wound healing; wound closure and no evidence of infection  Will show signs of wound healing; wound closure and no evidence of infection   Outcome: Ongoing      Problem: Pain:  Goal: Pain level will decrease  Pain level will decrease   Outcome: Ongoing    Goal: Control of chronic pain  Control of chronic pain   Outcome: Ongoing      Problem: Blood Glucose:  Goal: Ability to maintain appropriate glucose levels will improve  Ability to maintain appropriate glucose levels will improve   Outcome: Ongoing

## 2018-11-29 ENCOUNTER — HOSPITAL ENCOUNTER (OUTPATIENT)
Dept: WOUND CARE | Age: 73
Discharge: HOME | End: 2018-11-29
Payer: MEDICARE

## 2018-11-29 VITALS
WEIGHT: 215 LBS | DIASTOLIC BLOOD PRESSURE: 70 MMHG | SYSTOLIC BLOOD PRESSURE: 126 MMHG | RESPIRATION RATE: 18 BRPM | BODY MASS INDEX: 31.84 KG/M2 | HEART RATE: 64 BPM | TEMPERATURE: 98 F | HEIGHT: 69 IN

## 2018-11-29 PROCEDURE — 99213 OFFICE O/P EST LOW 20 MIN: CPT

## 2018-11-29 NOTE — PROGRESS NOTES
Wound Healing Center Followup Visit Note    Referring Physician : Jeny Gallo MD  Frances Goodrich  MEDICAL RECORD NUMBER:  [id number]  AGE: 68 y.o. GENDER: male  : 1945  EPISODE DATE:  2018    Subjective:     Chief Complaint   Patient presents with    Wound Check     patient here for treatment of right dorsal foot ulcer      HISTORY of PRESENT ILLNESS HPI   Frances Goodrich is a 68 y.o. male who presents today for wound/ulcer evaluation. Relates no drainage over last couple days. History of Wound Context:       Wound/Ulcer Pain Timing/Severity: none  Quality of pain: N/A  Severity:  0 / 10   Modifying Factors: None  Associated Signs/Symptoms: none    Ulcer Identification:  Ulcer Type: non-healing surgical  Contributing Factors: edema and venous stasis    Diabetic/Pressure/Non Pressure Ulcers only:  Ulcer: N/A    Wound: N/A        PAST MEDICAL HISTORY      Diagnosis Date    Arthritis     Cancer ([facility] [address]     COLON CA    Hypertension     Neuropathy      Past Surgical History:   Procedure Laterality Date    COLECTOMY      COLONOSCOPY      COLOSTOMY      EYE SURGERY      CATARACT OR    [place] DEBRIDEMENT, SKIN, SUB-Q TISSUE,MUSCLE,BONE,=<20 SQ CM Right 2018    INCISION AND DRAINAGE ABSCESS RIGHT FOOT performed by Riya Maravilla DPM at [facility], SKIN, SUB-Q TISSUE,MUSCLE,BONE,=<20 SQ CM Right 2018    DEBRIDEMENT WOUND RIGHT FOOT performed by Riya Maravilla DPM at [address]       History reviewed. No pertinent family history.   Social History   Substance Use Topics    Smoking status: Former Smoker    Smokeless tobacco: Never Used      Comment: quit in      Alcohol use 21.6 oz/week     36 Cans of beer per week     No Known Allergies  Current Outpatient Prescriptions on File Prior to Encounter   Medication Sig Dispense Refill    cetirizine (ZYRTEC) 10 MG tablet Take 1 tablet by mouth daily      fluticasone (FLONASE) 50 MCG/ACT

## 2018-12-06 ENCOUNTER — HOSPITAL ENCOUNTER (OUTPATIENT)
Dept: WOUND CARE | Age: 73
Discharge: HOME | End: 2018-12-06
Payer: MEDICARE

## 2018-12-06 VITALS
WEIGHT: 215 LBS | DIASTOLIC BLOOD PRESSURE: 70 MMHG | SYSTOLIC BLOOD PRESSURE: 130 MMHG | TEMPERATURE: 98.2 F | RESPIRATION RATE: 18 BRPM | BODY MASS INDEX: 31.75 KG/M2 | HEART RATE: 68 BPM

## 2018-12-06 PROCEDURE — 99213 OFFICE O/P EST LOW 20 MIN: CPT

## 2018-12-13 ENCOUNTER — HOSPITAL ENCOUNTER (OUTPATIENT)
Dept: WOUND CARE | Age: 73
Discharge: HOME | End: 2018-12-13
Payer: MEDICARE

## 2018-12-13 VITALS
DIASTOLIC BLOOD PRESSURE: 72 MMHG | TEMPERATURE: 97.6 F | HEIGHT: 69 IN | HEART RATE: 76 BPM | SYSTOLIC BLOOD PRESSURE: 138 MMHG | WEIGHT: 215 LBS | BODY MASS INDEX: 31.84 KG/M2 | RESPIRATION RATE: 16 BRPM

## 2018-12-13 PROCEDURE — 11042 DBRDMT SUBQ TIS 1ST 20SQCM/<: CPT

## 2018-12-20 ENCOUNTER — HOSPITAL ENCOUNTER (OUTPATIENT)
Dept: WOUND CARE | Age: 73
Discharge: HOME | End: 2018-12-20
Payer: MEDICARE

## 2018-12-20 VITALS
SYSTOLIC BLOOD PRESSURE: 128 MMHG | TEMPERATURE: 97.7 F | RESPIRATION RATE: 16 BRPM | HEART RATE: 68 BPM | HEIGHT: 69 IN | WEIGHT: 206 LBS | DIASTOLIC BLOOD PRESSURE: 68 MMHG | BODY MASS INDEX: 30.51 KG/M2

## 2018-12-20 PROCEDURE — 11042 DBRDMT SUBQ TIS 1ST 20SQCM/<: CPT

## 2018-12-20 NOTE — PROGRESS NOTES
pressure    Procedural Pain:  0  / 10   Post Procedural Pain:  0 / 10     Response to treatment:  Well tolerated by patient. Plan:   Treatment Note please see attached Discharge Instructions    Written patient dismissal instructions given to patient and signed by patient or POA. Discharge Instructions       Visit Discharge/Physician Orders     Discharge condition: Stable     Assessment of pain at discharge: NONE     Anesthetic used: LIDOCAINE 2%     Discharge to: home     Left via:ambulance     Accompanied by: SELF     ECF/[facility]HA:      Dressing Orders:WOUND LOCATION RIGHT FOOT WOUND CLEANSE WOUND WITH NORMAL SALINE APPLY AQUACEL COVER WITH A DRY DRESSING AND SECURE. CHANGE EVERY OTHER DAY AND AS NEEDED     SINGLE TUBIGRIP TO RT LEG.    Treatment Orders:FOLLOW A NUTRITIOUS DIET HIGH IN PROTEIN AND VITAMIN C TO PROMOTE WOUND HEALING. TAKE A MULTIVITAMIN DAILY IF OK WITH PCP     CONTACT  FOR OFFLOADING SHOE TO RIGHT FOOT. CALL DR Marixa Mckeon WHEN OBTAINED.      NONE  WEIGHTBEARING TO RIGHT FOOT.      [facility] followup visit ___2 WEEKS__Dr. Castro________________________  (Please note your next appointment above and if you are unable to keep, kindly give a 24 hour notice. Thank you.)     Physician signature:__________________________        If you experience any of the following, please call the [facility] during business hours:     * Increase in Pain  * Temperature over 101  * Increase in drainage from your wound  * Drainage with a foul odor  * Bleeding  * Increase in swelling  * Need for compression bandage changes due to slippage, breakthrough drainage.     If you need medical attention outside of the business hours of the [facility] please contact your PCP or go to the nearest emergency room.         Electronically signed by Park Yee DPM on 12/20/2018 at 11:19 AM

## 2018-12-20 NOTE — PROGRESS NOTES
Debridement Measurements:  Are located in the [place]  Documentation Flow Sheet    Wound/Ulcer #: 1    Post Debridement Measurements:  Wound/Ulcer Descriptions are Pre Debridement except measurements:    Wound 09/08/18 Other (Comment) Foot Right (Active)   Number of days: 102       Incision 09/09/18 Foot Right (Active)   Number of days: 101       Incision 09/12/18 Foot Right (Active)   Number of days: 99       Wound 11/29/18 Foot Dorsal;Right #1 surgical (Active)   Wound Image   12/6/2018 11:01 AM   Dressing Status Clean;Dry; Intact 12/20/2018 11:28 AM   Dressing Changed Changed/New 12/20/2018 11:28 AM   Dressing/Treatment Alginate;Dry dressing 12/20/2018 11:28 AM   Wound Cleansed Rinsed/Irrigated with saline 12/20/2018 11:28 AM   Wound Length (cm) 0.7 cm 12/20/2018 11:00 AM   Wound Width (cm) 1.1 cm 12/20/2018 11:00 AM   Wound Depth (cm) 0.1 cm 12/20/2018 11:00 AM   Wound Surface Area (cm^2) 0.77 cm^2 12/20/2018 11:00 AM   Change in Wound Size % (l*w) -220.83 12/20/2018 11:00 AM   Wound Volume (cm^3) 0.08 cm^3 12/20/2018 11:00 AM   Wound Healing % -300 12/20/2018 11:00 AM   Post-Procedure Length (cm) 0.8 cm 12/20/2018 11:12 AM   Post-Procedure Width (cm) 1.1 cm 12/20/2018 11:12 AM   Post-Procedure Depth (cm) 0.2 cm 12/20/2018 11:12 AM   Post-Procedure Surface Area (cm^2) 0.88 cm^2 12/20/2018 11:12 AM   Post-Procedure Volume (cm^3) 0.18 cm^3 12/20/2018 11:12 AM   Wound Assessment Red;Yellow 12/20/2018 11:00 AM   Drainage Amount Small 12/20/2018 11:00 AM   Drainage Description Derek Collie 12/20/2018 11:00 AM   Odor None 12/20/2018 11:00 AM   Sabiha-wound Assessment Dry;Pink 12/20/2018 11:00 AM   Number of days: 21     Percent of Wound/Ulcer Debrided: 100%    Total Surface Area Debrided:  1.0 sq cm     Estimated Blood Loss:  Minimal  Hemostasis Achieved:  by pressure    Procedural Pain:  0  / 10   Post Procedural Pain:  0 / 10     Response to treatment:  Well tolerated by patient.      Plan:   Treatment Note please see

## 2019-01-03 ENCOUNTER — HOSPITAL ENCOUNTER (OUTPATIENT)
Dept: WOUND CARE | Age: 74
Discharge: HOME | End: 2019-01-03
Payer: MEDICARE

## 2019-01-03 VITALS
SYSTOLIC BLOOD PRESSURE: 140 MMHG | BODY MASS INDEX: 30.81 KG/M2 | HEART RATE: 88 BPM | HEIGHT: 69 IN | TEMPERATURE: 98.2 F | RESPIRATION RATE: 16 BRPM | WEIGHT: 208 LBS | DIASTOLIC BLOOD PRESSURE: 70 MMHG

## 2019-01-03 PROCEDURE — 11042 DBRDMT SUBQ TIS 1ST 20SQCM/<: CPT

## 2019-01-03 RX ORDER — FUROSEMIDE 20 MG/1
20 TABLET ORAL DAILY
COMMUNITY

## 2019-01-10 ENCOUNTER — HOSPITAL ENCOUNTER (OUTPATIENT)
Dept: WOUND CARE | Age: 74
Discharge: HOME | End: 2019-01-10
Payer: MEDICARE

## 2019-01-10 VITALS
DIASTOLIC BLOOD PRESSURE: 70 MMHG | WEIGHT: 208 LBS | RESPIRATION RATE: 16 BRPM | TEMPERATURE: 98.4 F | SYSTOLIC BLOOD PRESSURE: 138 MMHG | BODY MASS INDEX: 30.72 KG/M2 | HEART RATE: 80 BPM

## 2019-01-10 PROCEDURE — 99213 OFFICE O/P EST LOW 20 MIN: CPT

## 2019-01-17 ENCOUNTER — HOSPITAL ENCOUNTER (OUTPATIENT)
Dept: WOUND CARE | Age: 74
Discharge: HOME | End: 2019-01-17
Payer: MEDICARE

## 2019-01-17 VITALS
DIASTOLIC BLOOD PRESSURE: 70 MMHG | BODY MASS INDEX: 31.75 KG/M2 | SYSTOLIC BLOOD PRESSURE: 132 MMHG | RESPIRATION RATE: 16 BRPM | TEMPERATURE: 98.2 F | HEART RATE: 80 BPM | WEIGHT: 215 LBS

## 2019-01-17 PROCEDURE — 11042 DBRDMT SUBQ TIS 1ST 20SQCM/<: CPT

## 2019-01-24 ENCOUNTER — HOSPITAL ENCOUNTER (OUTPATIENT)
Dept: WOUND CARE | Age: 74
Discharge: HOME | End: 2019-01-24
Payer: MEDICARE

## 2019-01-24 VITALS
WEIGHT: 215 LBS | DIASTOLIC BLOOD PRESSURE: 60 MMHG | TEMPERATURE: 98.5 F | HEIGHT: 69 IN | RESPIRATION RATE: 16 BRPM | BODY MASS INDEX: 31.84 KG/M2 | SYSTOLIC BLOOD PRESSURE: 122 MMHG | HEART RATE: 84 BPM

## 2019-01-24 PROCEDURE — 11042 DBRDMT SUBQ TIS 1ST 20SQCM/<: CPT

## 2019-02-07 ENCOUNTER — HOSPITAL ENCOUNTER (OUTPATIENT)
Dept: WOUND CARE | Age: 74
Discharge: HOME | End: 2019-02-07
Payer: MEDICARE

## 2020-01-01 ENCOUNTER — HOSPITAL ENCOUNTER (OUTPATIENT)
Age: 75
Discharge: HOME | End: 2020-11-22
Payer: MEDICARE

## 2020-01-01 ENCOUNTER — HOSPITAL ENCOUNTER (OUTPATIENT)
Dept: GENERAL RADIOLOGY | Age: 75
Discharge: HOME | End: 2020-11-22
Payer: MEDICARE

## 2020-01-01 PROCEDURE — 71046 X-RAY EXAM CHEST 2 VIEWS: CPT

## 2022-04-19 NOTE — PLAN OF CARE
Problem: Falls - Risk of:  Goal: Will remain free from falls  Will remain free from falls   Outcome: Met This Shift      Problem: Pain:  Goal: Control of acute pain  Control of acute pain   Outcome: Met This Shift No

## (undated) DEVICE — BAG SPECIMEN BIOHAZARD 6IN X 9IN

## (undated) DEVICE — PACK PROCEDURE SURG GEN CUST

## (undated) DEVICE — DRESSING,GAUZE,XEROFORM,CURAD,5"X9",ST: Brand: CURAD

## (undated) DEVICE — PAD,ABDOMINAL,5"X9",ST,LF,25/BX: Brand: MEDLINE INDUSTRIES, INC.

## (undated) DEVICE — STERILE HOOK LOCK LATEX FREE ELASTIC BANDAGE 4INX5YD: Brand: HOOK LOCK™

## (undated) DEVICE — 4-PORT MANIFOLD: Brand: NEPTUNE 2

## (undated) DEVICE — TRAP SPEC MUCUS FOR SUCT

## (undated) DEVICE — READY WET SKIN SCRUB TRAY-LF: Brand: MEDLINE INDUSTRIES, INC.

## (undated) DEVICE — CURITY PLAIN PACKING STRIP: Brand: CURITY

## (undated) DEVICE — BANDAGE,GAUZE,CONFORMING,3"X75",STRL,LF: Brand: MEDLINE

## (undated) DEVICE — STANDARD HYPODERMIC NEEDLE,ALUMINUM HUB: Brand: MONOJECT

## (undated) DEVICE — BNDG,ELSTC,MATRIX,STRL,3"X5YD,LF,HOOK&LP: Brand: MEDLINE

## (undated) DEVICE — INTENDED FOR TISSUE SEPARATION, AND OTHER PROCEDURES THAT REQUIRE A SHARP SURGICAL BLADE TO PUNCTURE OR CUT.: Brand: BARD-PARKER ® STAINLESS STEEL BLADES

## (undated) DEVICE — DRAPE,EXTREMITY,89X128,STERILE: Brand: MEDLINE

## (undated) DEVICE — BANDAGE,GAUZE,BULKEE II,4.5"X4.1YD,STRL: Brand: MEDLINE

## (undated) DEVICE — DRESSING,GAUZE,XEROFORM,CURAD,1"X8",ST: Brand: CURAD

## (undated) DEVICE — STERILE LATEX POWDER-FREE SURGICAL GLOVESWITH NITRILE COATING: Brand: PROTEXIS

## (undated) DEVICE — SWAB SPEC COLL SHFT L5.25IN POLYUR FOAM TIP SFT DBL MEDIA

## (undated) DEVICE — COVER HNDL LT DISP

## (undated) DEVICE — GAUZE,SPONGE,4"X4",8PLY,STRL,LF,10/TRAY: Brand: MEDLINE

## (undated) DEVICE — SWABSTICK SURG PREP BENZOIN TINCTURE SINGLE ST

## (undated) DEVICE — SPONGE LAP W18XL18IN WHT COT 4 PLY FLD STRUNG RADPQ DISP ST

## (undated) DEVICE — CONTROL SYRINGE LUER-LOCK TIP: Brand: MONOJECT

## (undated) DEVICE — TOWEL,OR,DSP,ST,BLUE,STD,6/PK,12PK/CS: Brand: MEDLINE

## (undated) DEVICE — CONTAINER VACUTAINER ANAER CULTURE SWAB

## (undated) DEVICE — PATIENT RETURN ELECTRODE, SINGLE-USE, CONTACT QUALITY MONITORING, ADULT, WITH 9FT CORD, FOR PATIENTS WEIGING OVER 33LBS. (15KG): Brand: MEGADYNE

## (undated) DEVICE — DOUBLE BASIN SET: Brand: MEDLINE INDUSTRIES, INC.

## (undated) DEVICE — STANDARD HYPODERMIC NEEDLE,POLYPROPYLENE HUB: Brand: MONOJECT

## (undated) DEVICE — BANDAGE,GAUZE,CONFORMING,4"X75",STRL,LF: Brand: MEDLINE